# Patient Record
Sex: MALE | Race: WHITE | NOT HISPANIC OR LATINO | Employment: OTHER | URBAN - METROPOLITAN AREA
[De-identification: names, ages, dates, MRNs, and addresses within clinical notes are randomized per-mention and may not be internally consistent; named-entity substitution may affect disease eponyms.]

---

## 2017-05-08 ENCOUNTER — HOSPITAL ENCOUNTER (EMERGENCY)
Facility: CLINIC | Age: 67
Discharge: HOME | End: 2017-05-08
Attending: PHYSICIAN ASSISTANT | Admitting: PHYSICIAN ASSISTANT
Payer: COMMERCIAL

## 2017-05-08 VITALS
SYSTOLIC BLOOD PRESSURE: 159 MMHG | TEMPERATURE: 99.1 F | DIASTOLIC BLOOD PRESSURE: 71 MMHG | HEIGHT: 68 IN | OXYGEN SATURATION: 95 % | RESPIRATION RATE: 16 BRPM
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
## 2017-05-08 PROCEDURE — 99213 OFFICE O/P EST LOW 20 MIN: CPT | Performed by: PHYSICIAN ASSISTANT

## 2017-05-08 PROCEDURE — 99213 OFFICE O/P EST LOW 20 MIN: CPT

## 2017-05-08 RX ORDER — DOXYCYCLINE 100 MG/1
100 CAPSULE ORAL 2 TIMES DAILY
Qty: 20 CAPSULE | Refills: 0 | Status: SHIPPED | OUTPATIENT
Start: 2017-05-08 | End: 2017-05-18

## 2017-05-08 RX ORDER — CEPHALEXIN 500 MG/1
500 CAPSULE ORAL 4 TIMES DAILY
Qty: 40 CAPSULE | Refills: 0 | Status: SHIPPED | OUTPATIENT
Start: 2017-05-08 | End: 2017-05-08

## 2017-05-08 NOTE — ED PROVIDER NOTES
"  History     Chief Complaint   Patient presents with     Wound Infection     HPI  Phil aFm is a 67 year old male who presents with complaints of tick bite to left flank a week ago.  He states the tick was \"large,\" but he is unsure if it was a deer tick or wood tick.  He has since developed surrounding erythema, swelling, and tenderness to the area.  No apparent bulls-eye rash noted.  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  He denies any other associated symptoms.  Denies fevers, chills, fatigue, or weakness.    I have reviewed the Medications, Allergies, Past Medical and Surgical History, and Social History in the Epic system.    Review of Systems   Constitutional: Negative.    Respiratory: Negative.    Cardiovascular: Negative.    Musculoskeletal: Negative.    Skin:        Tick bite to left flank with surrounding erythema, swelling   Neurological: Negative.    All other systems reviewed and are negative.      Physical Exam   BP: 159/71  Heart Rate: 92  Temp: 99.1  F (37.3  C)  Resp: 16  Height: 172.7 cm (5' 8\")  SpO2: 95 %  Physical Exam   Constitutional: He appears well-developed and well-nourished. No distress.   HENT:   Head: Normocephalic and atraumatic.   Cardiovascular: Normal rate, regular rhythm and normal heart sounds.    Pulmonary/Chest: Effort normal and breath sounds normal.   Musculoskeletal: Normal range of motion.   Neurological: He is alert.   Skin: Skin is warm and dry.   Tick bite site to left flank.  There is surrounding erythema, swelling, tenderness, and fluctuance.  There is a central scab.  No evidence of erythema migrans rash.         ED Course     ED Course     Procedures      Assessments & Plan (with Medical Decision Making)     Pt is a 67 year old male who presents with complaints of tick bite to left flank a week ago.  He states the tick was \"large,\" but he is unsure if it was a deer tick or wood tick.  He has since developed surrounding " erythema, swelling, and tenderness to the area.  No apparent bulls-eye rash noted.  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  He denies any other associated symptoms.  Denies fevers, chills, fatigue, or weakness.  Pt is afebrile on arrival.  Exam as above.  Hand-outs provided.    Patient was sent with Doxycycline and was instructed to follow-up with PCP if no improvement in 5-7 days for continued care and management or sooner if new or worsening symptoms.  He is to return to the ED for persistent and/or worsening symptoms.  Patient expressed understanding of the diagnosis and plan and was discharged home in good condition.    I have reviewed the nursing notes.    I have reviewed the findings, diagnosis, plan and need for follow up with the patient.    Discharge Medication List as of 5/8/2017  5:48 PM      START taking these medications    Details   doxycycline (VIBRAMYCIN) 100 MG capsule Take 1 capsule (100 mg) by mouth 2 times daily for 10 days, Disp-20 capsule, R-0, E-Prescribe             Final diagnoses:   Infected tick bite, initial encounter       5/8/2017   [facility] EMERGENCY DEPARTMENT     Geri Flores PA-C  05/10/17 5780

## 2017-05-08 NOTE — DISCHARGE INSTRUCTIONS
Tick Bite (Abx Tx)    Ticks are small insects that feed on the blood of rodents, rabbits, birds, deer, dogs and humans. The bite may cause a local reaction like that of a spider, with a small amount of local redness, itching and slight swelling. Sometimes there is no local reaction.  Most tick bites are harmless, but some ticks carry diseases, such as Lyme disease or [place] spotted fever, that can be passed to people at the time of the bite. Lyme disease is of greatest concern. At the present time, you have no symptoms of Lyme disease or other serious reaction to the bite. It is important to watch for the warning signs, which could appear weeks to months after the tick bite.  Home care  The following guidelines can help you care for your bite at home:  1. If itching is a problem, avoid tight clothing and anything that heats up your skin (hot showers/baths, direct sunlight). This often makes the itching worse. Use ice packs to help with redness and itching.  2. An ice pack (ice cubes in a plastic bag, wrapped in a towel) will reduce local areas of redness and itching. Over-the-counter creams containing benzocaine may help with itching.  3. If large areas of the skin are involved and if no other antihistamine was prescribed, over-the-counter antihistamines with diphenhydramine can be used. These are available at drug and grocery stores. These may be used to reduce itching if large areas of the skin are involved. If these are not helpful, talk to your doctor or pharmacist about other over-the counter medicines that may be helpful.  4. Antibiotics may be prescribed to reduce your risk of getting Lyme disease. It is very important that you take them exactly as directed until they are completely finished.  Follow-up care  Follow up with your doctor or as advised.  When to seek medical care  Get prompt medical attention if any of the following occur:  Signs off local infection (the next few days)  include:    Increasing redness around the bite site    Increased pain or swelling    Fever over 100.4 F (38.0 C)    Fluid draining from the bite area  Signs of tick-related disease (next few weeks to months) include:    Circular red ring-like rash appears at the bite area within 1 to 3 weeks    Tiredness, fever or chills, nausea or vomiting    Neck pain or stiffness, headache, confusion    Muscle, bone aching    Irregular or rapid heart beat    Joint pain or swelling (especially the knee joint)    Numbness or tingling or weakness in the arms or legs    Weakness on one side of the face    3659-5038 [employer]. [address] [place], [place] [address]. All rights reserved. This information is not intended as a substitute for professional medical care. Always follow your healthcare professional's instructions.

## 2017-05-08 NOTE — ED AVS SNAPSHOT
[facility] Emergency Department    [address] [address]    Phone:  [phone]    Fax:  [phone]                                       Phil Fam   MRN: [id number]    Department:  [facility] Emergency Department   Date of Visit:  5/8/2017           After Visit Summary Signature Page     I have received my discharge instructions, and my questions have been answered. I have discussed any challenges I see with this plan with the nurse or doctor.    ..........................................................................................................................................  Patient/Patient Representative Signature      ..........................................................................................................................................  Patient Representative Print Name and Relationship to Patient    ..................................................               ................................................  Date                                            Time    ..........................................................................................................................................  Reviewed by Signature/Title    ...................................................              ..............................................  Date                                                            Time

## 2017-05-08 NOTE — ED AVS SNAPSHOT
[facility] Emergency Department    [address] [address]    Phone:  [phone]    Fax:  [phone]                                       Phil Fam   MRN: [id number]    Department:  [facility] Emergency Department   Date of Visit:  5/8/2017           Patient Information     Date Of Birth          1950        Your diagnoses for this visit were:     Infected tick bite, initial encounter        You were seen by Geri Flores PA-C.      Follow-up Information     Call Dewey Lema MD.    Specialty:  Internal Medicine    Why:  As needed, For persistent symptoms    Contact information:    [facility]  [address] [address]  [phone]          Follow up with [facility] Emergency Department.    Specialty:  EMERGENCY MEDICINE    Why:  As needed, If symptoms worsen    Contact information:    [address] [place] [id number] [phone]    Additional information:    The medical center is located at   [address] [place] [address] (between [facility] and   [address] in [place], four miles north   of [place]).        Discharge Instructions         Tick Bite (Abx Tx)    Ticks are small insects that feed on the blood of rodents, rabbits, birds, deer, dogs and humans. The bite may cause a local reaction like that of a spider, with a small amount of local redness, itching and slight swelling. Sometimes there is no local reaction.  Most tick bites are harmless, but some ticks carry diseases, such as Lyme disease or [place] spotted fever, that can be passed to people at the time of the bite. Lyme disease is of greatest concern. At the present time, you have no symptoms of Lyme disease or other serious reaction to the bite. It is important to watch for the warning signs, which could appear weeks to months after the tick bite.  Home care  The following guidelines can help you care for your bite at home:  1. If itching is a  problem, avoid tight clothing and anything that heats up your skin (hot showers/baths, direct sunlight). This often makes the itching worse. Use ice packs to help with redness and itching.  2. An ice pack (ice cubes in a plastic bag, wrapped in a towel) will reduce local areas of redness and itching. Over-the-counter creams containing benzocaine may help with itching.  3. If large areas of the skin are involved and if no other antihistamine was prescribed, over-the-counter antihistamines with diphenhydramine can be used. These are available at drug and grocery stores. These may be used to reduce itching if large areas of the skin are involved. If these are not helpful, talk to your doctor or pharmacist about other over-the counter medicines that may be helpful.  4. Antibiotics may be prescribed to reduce your risk of getting Lyme disease. It is very important that you take them exactly as directed until they are completely finished.  Follow-up care  Follow up with your doctor or as advised.  When to seek medical care  Get prompt medical attention if any of the following occur:  Signs off local infection (the next few days) include:    Increasing redness around the bite site    Increased pain or swelling    Fever over 100.4 F (38.0 C)    Fluid draining from the bite area  Signs of tick-related disease (next few weeks to months) include:    Circular red ring-like rash appears at the bite area within 1 to 3 weeks    Tiredness, fever or chills, nausea or vomiting    Neck pain or stiffness, headache, confusion    Muscle, bone aching    Irregular or rapid heart beat    Joint pain or swelling (especially the knee joint)    Numbness or tingling or weakness in the arms or legs    Weakness on one side of the face    7355-0788 [employer]. [address] [place] [address], [place] [address]. All rights reserved. This information is not intended as a substitute for professional medical care. Always follow your healthcare  professional's instructions.          24 Hour Appointment Hotline       To make an appointment at any [facility], call [phone] ([phone]). If you don't have a family doctor or clinic, we will help you find one. [facility] clinics are conveniently located to serve the needs of you and your family.             Review of your medicines      START taking        Dose / Directions Last dose taken    doxycycline 100 MG capsule   Commonly known as:  VIBRAMYCIN   Dose:  100 mg   Quantity:  20 capsule        Take 1 capsule (100 mg) by mouth 2 times daily for 10 days   Refills:  0          Our records show that you are taking the medicines listed below. If these are incorrect, please call your family doctor or clinic.        Dose / Directions Last dose taken    ASPIRIN PO   Dose:  325 mg        Take 325 mg by mouth daily   Refills:  0        BOSUTINIB PO   Dose:  400 mg   Indication:  Bolivar Chromosome + Chronic Myelocytic Leukemia        Take 400 mg by mouth daily   Refills:  0        COMPAZINE PO   Dose:  10 mg        Take 10 mg by mouth every 6 hours as needed for nausea   Refills:  0        FLONASE 50 MCG/DOSE nasal spray   Quantity:  one         INHALE 2 SPRAYS IN EACH NOSTRIL ONCE DAILY   Refills:  0        LOPID PO   Dose:  600 mg        Take 600 mg by mouth 2 times daily (before meals)   Refills:  0        METFORMIN HCL PO   Dose:  500 mg        Take 500 mg by mouth 2 times daily (with meals)   Refills:  0        OMEGA-3 FISH OIL PO   Dose:  2 g        Take 2 g by mouth daily   Refills:  0        psyllium 0.52 G capsule   Dose:  1 capsule        Take 1 capsule by mouth daily   Refills:  0        selenium sulfide 2.5 % lotion   Commonly known as:  SELSUN        Apply topically daily as needed for itching or irritation   Refills:  0                Prescriptions were sent or printed at these locations (1 Prescription)                   [facility] - [place], [place] - [address] [facility]   [address]  "[facility]. [facility] [address]    Telephone:  [phone]   Fax:  [phone]   Hours:                  E-Prescribed (1 of 1)         doxycycline (VIBRAMYCIN) 100 MG capsule                Orders Needing Specimen Collection     None      Pending Results     No orders found from 2017 to 2017.            Pending Culture Results     No orders found from 2017 to 2017.            Pending Results Instructions     If you had any lab results that were not finalized at the time of your Discharge, you can call the ED Lab Result RN at [phone]. You will be contacted by this team for any positive Lab results or changes in treatment. The nurses are available 7 days a week from 10A to 6:30P.  You can leave a message 24 hours per day and they will return your call.        Test Results From Your Hospital Stay               Thank you for choosing [facility]       Thank you for choosing [facility] for your care. Our goal is always to provide you with excellent care. Hearing back from our patients is one way we can continue to improve our services. Please take a few minutes to complete the written survey that you may receive in the mail after you visit with us. Thank you!        "[employer] Information     [employer] lets you send messages to your doctor, view your test results, renew your prescriptions, schedule appointments and more. To sign up, go to [email or web address] . Click on \"Log in\" on the left side of the screen, which will take you to the Welcome page. Then click on \"Sign up Now\" on the right side of the page.     You will be asked to enter the access code listed below, as well as some personal information. Please follow the directions to create your username and password.     Your access code is: [email or web address]  Expires: 2017  5:48 PM     Your access code will  in 90 days. If you need help or a new code, please call your [facility] clinic or [phone].        Care EveryWhere ID     This is " your Care EveryWhere ID. This could be used by other organizations to access your [facility] medical records  [id number]        After Visit Summary       This is your record. Keep this with you and show to your community pharmacist(s) and doctor(s) at your next visit.

## 2017-05-10 ASSESSMENT — ENCOUNTER SYMPTOMS
MUSCULOSKELETAL NEGATIVE: 1
CONSTITUTIONAL NEGATIVE: 1
NEUROLOGICAL NEGATIVE: 1
CARDIOVASCULAR NEGATIVE: 1
RESPIRATORY NEGATIVE: 1

## 2017-07-12 ENCOUNTER — HOSPITAL ENCOUNTER (OUTPATIENT)
Dept: CARDIAC REHAB | Facility: CLINIC | Age: 67
End: 2017-07-12
Attending: INTERNAL MEDICINE
Payer: COMMERCIAL

## 2017-07-12 PROCEDURE — 40000116 ZZH STATISTIC OP CR VISIT

## 2017-07-12 PROCEDURE — 93797 PHYS/QHP OP CAR RHAB WO ECG: CPT | Mod: 59

## 2017-07-12 PROCEDURE — 93798 PHYS/QHP OP CAR RHAB W/ECG: CPT

## 2017-07-12 PROCEDURE — 40000575 ZZH STATISTIC OP CARDIAC VISIT #2

## 2017-07-17 ENCOUNTER — HOSPITAL ENCOUNTER (OUTPATIENT)
Dept: CARDIAC REHAB | Facility: CLINIC | Age: 67
End: 2017-07-17
Attending: INTERNAL MEDICINE
Payer: COMMERCIAL

## 2017-07-17 PROCEDURE — 93798 PHYS/QHP OP CAR RHAB W/ECG: CPT | Performed by: REHABILITATION PRACTITIONER

## 2017-07-17 PROCEDURE — 40000116 ZZH STATISTIC OP CR VISIT: Performed by: REHABILITATION PRACTITIONER

## 2017-07-19 ENCOUNTER — HOSPITAL ENCOUNTER (OUTPATIENT)
Dept: CARDIAC REHAB | Facility: CLINIC | Age: 67
End: 2017-07-19
Attending: INTERNAL MEDICINE
Payer: COMMERCIAL

## 2017-07-19 PROCEDURE — 40000116 ZZH STATISTIC OP CR VISIT

## 2017-07-19 PROCEDURE — 93798 PHYS/QHP OP CAR RHAB W/ECG: CPT

## 2017-07-21 ENCOUNTER — HOSPITAL ENCOUNTER (OUTPATIENT)
Dept: CARDIAC REHAB | Facility: CLINIC | Age: 67
End: 2017-07-21
Attending: INTERNAL MEDICINE
Payer: COMMERCIAL

## 2017-07-21 PROCEDURE — 93798 PHYS/QHP OP CAR RHAB W/ECG: CPT

## 2017-07-21 PROCEDURE — 40000116 ZZH STATISTIC OP CR VISIT

## 2017-07-24 ENCOUNTER — HOSPITAL ENCOUNTER (OUTPATIENT)
Dept: CARDIAC REHAB | Facility: CLINIC | Age: 67
End: 2017-07-24
Attending: INTERNAL MEDICINE
Payer: COMMERCIAL

## 2017-07-24 PROCEDURE — 40000116 ZZH STATISTIC OP CR VISIT

## 2017-07-24 PROCEDURE — 93798 PHYS/QHP OP CAR RHAB W/ECG: CPT

## 2017-07-26 ENCOUNTER — HOSPITAL ENCOUNTER (OUTPATIENT)
Dept: CARDIAC REHAB | Facility: CLINIC | Age: 67
End: 2017-07-26
Attending: INTERNAL MEDICINE
Payer: COMMERCIAL

## 2017-07-26 PROCEDURE — 40000116 ZZH STATISTIC OP CR VISIT

## 2017-07-26 PROCEDURE — 93798 PHYS/QHP OP CAR RHAB W/ECG: CPT

## 2017-07-28 ENCOUNTER — HOSPITAL ENCOUNTER (OUTPATIENT)
Dept: CARDIAC REHAB | Facility: CLINIC | Age: 67
End: 2017-07-28
Attending: INTERNAL MEDICINE
Payer: COMMERCIAL

## 2017-07-28 PROCEDURE — 93798 PHYS/QHP OP CAR RHAB W/ECG: CPT | Performed by: REHABILITATION PRACTITIONER

## 2017-07-28 PROCEDURE — 40000116 ZZH STATISTIC OP CR VISIT: Performed by: REHABILITATION PRACTITIONER

## 2017-07-30 ENCOUNTER — HOSPITAL ENCOUNTER (EMERGENCY)
Facility: CLINIC | Age: 67
Discharge: HOME | End: 2017-07-30
Attending: EMERGENCY MEDICINE | Admitting: EMERGENCY MEDICINE
Payer: COMMERCIAL

## 2017-07-30 VITALS
DIASTOLIC BLOOD PRESSURE: 75 MMHG | RESPIRATION RATE: 22 BRPM | TEMPERATURE: 99 F | SYSTOLIC BLOOD PRESSURE: 129 MMHG | OXYGEN SATURATION: 98 % | HEART RATE: 92 BPM

## 2017-07-30 DIAGNOSIS — N18.9 CHRONIC KIDNEY DISEASE, UNSPECIFIED: ICD-10-CM

## 2017-07-30 DIAGNOSIS — C92.10 CHRONIC MYELOID LEUKEMIA (H): ICD-10-CM

## 2017-07-30 DIAGNOSIS — M10.9 ACUTE GOUTY ARTHRITIS: ICD-10-CM

## 2017-07-30 DIAGNOSIS — Z95.1 S/P CABG (CORONARY ARTERY BYPASS GRAFT): ICD-10-CM

## 2017-07-30 PROCEDURE — 99283 EMERGENCY DEPT VISIT LOW MDM: CPT | Performed by: EMERGENCY MEDICINE

## 2017-07-30 PROCEDURE — 25000132 ZZH RX MED GY IP 250 OP 250 PS 637: Performed by: EMERGENCY MEDICINE

## 2017-07-30 PROCEDURE — 99284 EMERGENCY DEPT VISIT MOD MDM: CPT | Mod: Z6 | Performed by: EMERGENCY MEDICINE

## 2017-07-30 RX ORDER — CLOPIDOGREL BISULFATE 75 MG/1
75 TABLET ORAL DAILY
COMMUNITY
Start: 2017-07-06 | End: 2018-07-06

## 2017-07-30 RX ORDER — LIDOCAINE 50 MG/G
OINTMENT TOPICAL PRN
COMMUNITY
Start: 2017-07-06

## 2017-07-30 RX ORDER — ACETAMINOPHEN 500 MG
1000 TABLET ORAL 3 TIMES DAILY
COMMUNITY
Start: 2017-07-06

## 2017-07-30 RX ORDER — AMOXICILLIN 250 MG
1 CAPSULE ORAL 2 TIMES DAILY
COMMUNITY
Start: 2017-07-06

## 2017-07-30 RX ORDER — HYDROCODONE BITARTRATE AND ACETAMINOPHEN 5; 325 MG/1; MG/1
TABLET ORAL
Qty: 20 TABLET | Refills: 0 | Status: SHIPPED | OUTPATIENT
Start: 2017-07-30

## 2017-07-30 RX ORDER — NITROGLYCERIN 0.4 MG/1
0.4 TABLET SUBLINGUAL EVERY 5 MIN PRN
COMMUNITY
Start: 2017-05-30

## 2017-07-30 RX ORDER — HYDROCODONE BITARTRATE AND ACETAMINOPHEN 5; 325 MG/1; MG/1
2 TABLET ORAL ONCE
Status: COMPLETED | OUTPATIENT
Start: 2017-07-30 | End: 2017-07-30

## 2017-07-30 RX ORDER — METHYLPREDNISOLONE 4 MG
TABLET, DOSE PACK ORAL
Qty: 21 TABLET | Refills: 0 | Status: SHIPPED | OUTPATIENT
Start: 2017-07-30

## 2017-07-30 RX ORDER — ASPIRIN 81 MG/1
81 TABLET, CHEWABLE ORAL DAILY
COMMUNITY
Start: 2017-07-06

## 2017-07-30 RX ORDER — METOPROLOL TARTRATE 37.5 MG/1
37.5 TABLET, FILM COATED ORAL 2 TIMES DAILY
COMMUNITY
Start: 2017-07-06 | End: 2017-08-05

## 2017-07-30 RX ORDER — CILOSTAZOL 100 MG/1
100 TABLET ORAL 2 TIMES DAILY
COMMUNITY
Start: 2017-05-30 | End: 2018-05-30

## 2017-07-30 RX ORDER — FERROUS SULFATE 325(65) MG
325 TABLET ORAL
COMMUNITY
Start: 2017-07-06

## 2017-07-30 RX ADMIN — HYDROCODONE BITARTRATE AND ACETAMINOPHEN 2 TABLET: 5; 325 TABLET ORAL at 12:41

## 2017-07-30 ASSESSMENT — ENCOUNTER SYMPTOMS
WEAKNESS: 0
CONSTITUTIONAL NEGATIVE: 1
WOUND: 0
NUMBNESS: 0

## 2017-07-30 NOTE — ED NOTES
Quadruple bypass 4 weeks ago, this is the second bought of gout. Kidney issues, has already had a medrol dosepac.

## 2017-07-30 NOTE — ED AVS SNAPSHOT
[facility]    [address] [address]    Phone:  [phone]    Fax:  [phone]                                       Phil Fam   MRN: [id number]    Department:  [facility]   Date of Visit:  7/30/2017           After Visit Summary Signature Page     I have received my discharge instructions, and my questions have been answered. I have discussed any challenges I see with this plan with the nurse or doctor.    ..........................................................................................................................................  Patient/Patient Representative Signature      ..........................................................................................................................................  Patient Representative Print Name and Relationship to Patient    ..................................................               ................................................  Date                                            Time    ..........................................................................................................................................  Reviewed by Signature/Title    ...................................................              ..............................................  Date                                                            Time

## 2017-07-30 NOTE — DISCHARGE INSTRUCTIONS
"  What Is Gout?  Gout is a disease that affects the joints. Left untreated, it can lead to painful foot and joint deformities and even kidney problems. But, by treating gout early, you can relieve pain and help prevent future problems. Gout can usually be treated with medication and proper diet. In severe cases, surgery may be needed.  What causes gout?  Gout is caused by an excess of uric acid (a waste product made by the body). Uric acid is excreted by the kidneys. If the uric acid level in your blood rises too high, the uric acid may form crystals that collect in the joints, bringing on a gout attack. If you have many gout attacks, crystals may form large deposits called tophi. Tophi can damage joints and cause deformity.  Who is at risk for gout?  Men are more likely to have gout than women. But women can also be affected, mostly after menopause. Some health problems, such as obesity and high cholesterol, make gout more likely. And some medications, such as diuretics ( water pills ), can trigger a gout attack. People who drink a lot of alcohol are at high risk for gout. Certain foods can also trigger a gout attack.  Substances that may trigger a gout attack  To help prevent a gout attack, avoid these foods:    Alcohol (particularly beer, but also red wine and spirits)    Certain meats (red meat, processed meat, turkey)    Organ meats (kidney, liver, sweetbread)    Shellfish (lobster, crab, shrimp, scallop, mussel)    Certain fish (anchovy, sardine, herring, mackerel)   Treatment    Lifestyle changes, including weight loss, exercise, and quitting tobacco use    Reducing consumption of the food groups above as well as high fructose corn syrup, found in many foods including sodas and energy drinks    Changing non-essential medications that may contribute to gout (such as thiazide diuretics--\"water pills\")    Medications to reduce the amount of uric acid in the blood, such as allopurinol or others    Medications to " treat acute gout attacks, including NSAIDs (nonsteroidal anti-inflammatory medicines), steroids, and colchicine  Date Last Reviewed: 2/1/2016 2000-2017 [employer]. [address] [place] [address], [place] [address]. All rights reserved. This information is not intended as a substitute for professional medical care. Always follow your healthcare professional's instructions.          Eating to Prevent Gout  Gout is a painful form of arthritis caused by an excess of uric acid. This is a waste product made by the body. It builds up in the body and forms crystals that collect in the joints, bringing on a gout attack. Alcohol and certain foods can trigger a gout attack. Below are some guidelines for changing your diet to help you manage gout. Your healthcare provider can work with you to determine the best eating plan for you. Know that diet is only one part of managing gout. Take your medicines as prescribed and follow the other guidelines your healthcare provider has given you.  Foods to limit  Eating too many foods containing purines may increase the levels of uric acid in your body and increase your risk for a gout attack. It may be best to limit these high-purine foods:    Alcohol (beer, red wine). You may be told to avoid alcohol completely.    Certain fish (anchovies, sardines, fish roes, herring, tuna, mussels, codfish, scallops, trout, and sonu)    Certain meats (red meat, processed meat, zavala, turkey, wild game, and goose)    Sauces and gravies made with meat    Organ meats (such as liver, kidneys, sweetbreads, and tripe)    Legumes (such as dried beans, peas)    Mushrooms, spinach, asparagus, and cauliflower    Yeast and yeast extract supplements  Foods to try  Some foods may be helpful for people with gout. You may want to try adding some of the following foods to your diet:    Dark berries: These include blueberries, blackberries, and cherries. These berries contain chemicals that may lower uric  acid.    Tofu: Tofu, which is made from soy, is a good source of protein. Studies have shown that it may be a better choice than meat for people with gout.    Omega fatty acids: These acids are found in fatty fish (such as salmon), certain oils (such as flax, olive, or nut oils), or nuts. They may help prevent inflammation due to gout.  The following guidelines are recommended by the American Medical Association for people with gout. Your diet should be:    High in fiber, whole grains, fruits, and vegetables.    Low in protein (15% of calories should come from protein. Choose lean sources such as soy, lean meats, and poultry).    Low in fat (no more than 30% of calories should come from fat, with only 10% coming from animal fat).   Date Last Reviewed: 6/17/2015 2000-2017 [employer]. [address] [place] [address]. All rights reserved. This information is not intended as a substitute for professional medical care. Always follow your healthcare professional's instructions.          Gout Diet  Gout is a painful condition caused by an excess of uric acid, a waste product made by the body. Uric acid forms crystals that collect in the joints. The immune response to these crystals brings on symptoms of joint pain and swelling. This is called a gout attack. Often, medications and diet changes are combined to manage gout. Below are some guidelines for changing your diet to help you manage gout and prevent attacks. Your health care provider will help you determine the best eating plan for you.     Eating to manage gout  Weight loss for those who are overweight may help reduce gout attacks.  Eat less of these foods  Eating too many foods containing purines may raise the levels of uric acid in your body. This raises your risk for a gout attack. Try to limit these foods and drinks:    Alcohol, such as beer and red wine. You may be told to avoid alcohol completely.    Soft drinks that contain sugar or  high fructose corn syrup    Certain fish, including anchovies, sardines, fish eggs, and herring    Shellfish    Certain meats, such as red meat, hot dogs, luncheon meats, and turkey    Organ meats, such as liver, kidneys, and sweetbreads    Legumes, such as dried beans and peas    Other high fat foods such as gravy, whole milk, and high fat cheeses    Vegetables such as asparagus, cauliflower, spinach, and mushrooms used to be thought to contribute to an increased risk for a gout attack, but recent studies show that high purine vegetables don't increase the risk for a gout attack.  Eat more of these foods  Other foods may be helpful for people with gout. Add some of these foods to your diet:    Cherries contain chemicals that may lower uric acid.    Omega fatty acids. These are found in some fatty fish such as salmon, certain oils (flax, olive, or nut), and nuts themselves. Omega fatty acids may help prevent inflammation due to gout.    Dairy products that are low-fat or fat-free, such as cheese and yogurt    Complex carbohydrate foods, including whole grains, brown rice, oats, and beans    Coffee, in moderation    Water, approximately 64 ounces per day  Follow-up care  Follow up with your healthcare provider as advised.  When to seek medical advice  Call your healthcare provider right away if any of these occur:    Return of gout symptoms, usually at night:    Severe pain, swelling, and heat in a joint, especially the base of the big toe    Affected joint is hard to move    Skin of the affected joint is purple or red    Fever of 100.4 F (38 C) or higher    Pain that doesn't get better even with prescribed medicine   Date Last Reviewed: 1/12/2016 2000-2017 [employer]. [address] [place] [address]. All rights reserved. This information is not intended as a substitute for professional medical care. Always follow your healthcare professional's instructions.          Treating Gout  Attacks     Raising the joint above the level of your heart can help reduce gout symptoms.     Gout is a disease that affects the joints. It is caused by excess uric acid in your blood stream that may lead to crystals forming in your joints. Left untreated, it can lead to painful foot and joint deformities and even kidney problems. But, by treating gout early, you can relieve pain and help prevent future problems. Gout can usually be treated with medication and proper diet. In severe cases, surgery may be needed.  Gout attacks are painful and often happen more than once. Taking medications may reduce pain and prevent attacks in the future. There are also some things you can do at home to relieve symptoms.  Medications for gout  Your healthcare provider may prescribe a daily medication to reduce levels of uric acid. Reducing your uric acid levels may help prevent gout attacks. Allopurinol is one commonly used medication taken daily to reduce uric acid levels. Other medications can help relieve pain and swelling during an acute attack. Medicines such as NSAIDs (nonsteroidal anti-inflammatory medicines), steroids, and colchicine may be prescribed for intermittent use to relieve an acute gout attack. Be sure to take your medication as directed.  What you can do  Below are some things you can do at home to relieve gout symptoms. Your healthcare provider may have other tips.    Rest the painful joint as much as you can.    Raise the painful joint so it is at a level higher than your heart.    Use ice for 10 minutes every 1-2 hours as possible.  How can I prevent gout?  With a little effort, you may be able to prevent gout attacks in the future. Here are some things you can do:    Avoid foods high in purines    Certain meats (red meat, processed meat, turkey)    Organ meats (kidney, liver, sweetbread)    Shellfish (lobster, crab, shrimp, scallop, mussel)    Certain fish (anchovy, sardine, herring, mackerel)    Take any  medications prescribed by your healthcare provider.    Lose weight if you need to.    Reduce high fructose corn syrup in meals and drinks.    Reduce or eliminate consumption of alcohol, particularly beer, but also red wine and spirits.    Control blood pressure, diabetes, and cholesterol.    Drink plenty of water to help flush uric acid from your body.  Date Last Reviewed: 2/1/2016 2000-2017 [employer]. [address] [place] [address], [place] [address]. All rights reserved. This information is not intended as a substitute for professional medical care. Always follow your healthcare professional's instructions.

## 2017-07-30 NOTE — ED PROVIDER NOTES
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 Similar flare earlier this month was treated effectively with a Medrol Dosepak which he completed approximately 1.5 weeks ago.  He has a history of CML and is on chemotherapy and has a history of chronic kidney disease.  Right great toe pain was insidious in onset, and is aching and throbbing, constant, gradually progressive and worsening, now severe.  Pain is nonradiating.  Unable to take NSAIDs due to history of chronic kidney disease.    I have reviewed the Medications, Allergies, Past Medical and Surgical History, and Social History in the Epic system.  Patient Active Problem List   Diagnosis     Tobacco use disorder     Allergic rhinitis     Senile nuclear sclerosis     Past Medical History:   Diagnosis Date     CML (chronic myelocytic leukaemia)      Diabetes (H)      Duodenal ulcer, unspecified as acute or chronic, without hemorrhage, perforation, or obstruction      Other malaria      Past Surgical History:   Procedure Laterality Date     PHACOEMULSIFICATION WITH STANDARD INTRAOCULAR LENS IMPLANT Right 2/12/2015    Procedure: PHACOEMULSIFICATION WITH STANDARD INTRAOCULAR LENS IMPLANT;  Surgeon: Chucky Barrera MD;  Location: [facility] OR     PHACOEMULSIFICATION WITH STANDARD INTRAOCULAR LENS IMPLANT Left 3/18/2015    Procedure: PHACOEMULSIFICATION WITH STANDARD INTRAOCULAR LENS IMPLANT;  Surgeon: Chucky Barrera MD;  Location: [facility] OR     SURGICAL HISTORY OF -       septoplasty     SURGICAL HISTORY OF -   07/18/2000    colonoscopy     No current facility-administered medications for this encounter.      Current  Outpatient Prescriptions   Medication     methylPREDNISolone (MEDROL DOSEPAK) 4 MG tablet     HYDROcodone-acetaminophen (NORCO) 5-325 MG per tablet     ASPIRIN PO     BOSUTINIB PO     Gemfibrozil (LOPID PO)     METFORMIN HCL PO     Omega-3 Fatty Acids (OMEGA-3 FISH OIL PO)     Prochlorperazine Maleate (COMPAZINE PO)     psyllium 0.52 G capsule     selenium sulfide (SELSUN) 2.5 % shampoo     FLONASE INHA 50 MCG/DOSE NA     Allergies   Allergen Reactions     Nka [No Known Allergies]      Social History   Substance Use Topics     Smoking status: Current Every Day Smoker     Packs/day: 1.00     Years: 35.00     Types: Cigarettes     Smokeless tobacco: Not on file     Alcohol use Yes      Comment: socially     Family History   Problem Relation Age of Onset     DIABETES Mother      CANCER Father      Review of Systems   Constitutional: Negative.    Skin: Negative for pallor, rash and wound.   Neurological: Negative for weakness and numbness.       Physical Exam   BP: 129/75  Pulse: 92  Temp: 99  F (37.2  C)  Resp: 22  SpO2: 98 %  Physical Exam   Constitutional: He is oriented to person, place, and time. He appears well-developed and well-nourished. No distress.   HENT:   Head: Normocephalic and atraumatic.   Eyes: Conjunctivae and EOM are normal. No scleral icterus.   Neck: Normal range of motion. Neck supple.   Cardiovascular: Normal rate, regular rhythm and intact distal pulses.    Pulmonary/Chest: Effort normal. No respiratory distress.   Abdominal: Soft. Bowel sounds are normal. He exhibits no distension and no mass. There is no tenderness. There is no rebound and no guarding.   Musculoskeletal: Normal range of motion. He exhibits edema (right great toe MTP joint area) and tenderness (right great toe MTP).   Neurological: He is alert and oriented to person, place, and time. He has normal strength. No sensory deficit.   Skin: Skin is warm and dry. No rash noted. He is not diaphoretic. There is erythema (mild erythema  and warmth about the right great toe MTP joint, no crepitance or lymphangitis). No pallor.   Psychiatric: He has a normal mood and affect. His behavior is normal.   Nursing note and vitals reviewed.      ED Course     ED Course     Procedures               Labs Ordered and Resulted from Time of ED Arrival Up to the Time of Departure from the ED - No data to display    Assessments & Plan (with Medical Decision Making)   Patient with history of recent CABG, runny kidney disease, CML on chemotherapy, was secondary gout flare of the right right toe MTP joint in the past month.  Good relief with a Medrol Dosepak earlier this month.  Will repeat this.  Defer colchicine due to his chronic kidney disease.  Rx norco to use as needed for pain. He is unable to take NSAIDs due to history of chronic kidney disease.  We'll have him follow up with his primary care provider in the near future, as currently scheduled, and his heme-onc specialist.    Patient was provided instructions for supportive care and will return as needed for worsened condition or worsening symptoms, or new problems or concerns.    I have reviewed the nursing notes.    I have reviewed the findings, diagnosis, plan and need for follow up with the patient.      Discharge Medication List as of 7/30/2017 12:44 PM      START taking these medications    Details   methylPREDNISolone (MEDROL DOSEPAK) 4 MG tablet Follow package instructions, Disp-21 tablet, R-0, Local Print      HYDROcodone-acetaminophen (NORCO) 5-325 MG per tablet 1-2 tabs po q 4-6 hrs. prn pain, Disp-20 tablet, R-0, Local Print             Final diagnoses:   Acute gouty arthritis - Right great toe MTP joint       7/30/2017   [facility] EMERGENCY DEPARTMENT     Tigre Menendez MD  07/30/17 [id number]

## 2017-08-02 ENCOUNTER — HOSPITAL ENCOUNTER (OUTPATIENT)
Dept: CARDIAC REHAB | Facility: CLINIC | Age: 67
End: 2017-08-02
Attending: INTERNAL MEDICINE
Payer: COMMERCIAL

## 2017-08-02 PROCEDURE — 40000575 ZZH STATISTIC OP CARDIAC VISIT #2

## 2017-08-02 PROCEDURE — 40000116 ZZH STATISTIC OP CR VISIT

## 2017-08-02 PROCEDURE — 93798 PHYS/QHP OP CAR RHAB W/ECG: CPT

## 2017-08-02 PROCEDURE — 93797 PHYS/QHP OP CAR RHAB WO ECG: CPT

## 2017-08-04 ENCOUNTER — HOSPITAL ENCOUNTER (OUTPATIENT)
Dept: CARDIAC REHAB | Facility: CLINIC | Age: 67
End: 2017-08-04
Attending: INTERNAL MEDICINE
Payer: COMMERCIAL

## 2017-08-04 PROCEDURE — 93798 PHYS/QHP OP CAR RHAB W/ECG: CPT

## 2017-08-04 PROCEDURE — 40000116 ZZH STATISTIC OP CR VISIT

## 2017-08-07 ENCOUNTER — HOSPITAL ENCOUNTER (OUTPATIENT)
Dept: CARDIAC REHAB | Facility: CLINIC | Age: 67
End: 2017-08-07
Attending: INTERNAL MEDICINE
Payer: COMMERCIAL

## 2017-08-07 PROCEDURE — 40000116 ZZH STATISTIC OP CR VISIT

## 2017-08-07 PROCEDURE — 93798 PHYS/QHP OP CAR RHAB W/ECG: CPT

## 2017-08-11 ENCOUNTER — HOSPITAL ENCOUNTER (OUTPATIENT)
Dept: CARDIAC REHAB | Facility: CLINIC | Age: 67
End: 2017-08-11
Attending: INTERNAL MEDICINE
Payer: COMMERCIAL

## 2017-08-11 PROCEDURE — 40000116 ZZH STATISTIC OP CR VISIT

## 2017-08-11 PROCEDURE — 93798 PHYS/QHP OP CAR RHAB W/ECG: CPT

## 2017-08-14 ENCOUNTER — HOSPITAL ENCOUNTER (OUTPATIENT)
Dept: CARDIAC REHAB | Facility: CLINIC | Age: 67
End: 2017-08-14
Attending: INTERNAL MEDICINE
Payer: COMMERCIAL

## 2017-08-14 PROCEDURE — 93798 PHYS/QHP OP CAR RHAB W/ECG: CPT

## 2017-08-14 PROCEDURE — 40000116 ZZH STATISTIC OP CR VISIT

## 2017-08-16 ENCOUNTER — HOSPITAL ENCOUNTER (OUTPATIENT)
Dept: CARDIAC REHAB | Facility: CLINIC | Age: 67
End: 2017-08-16
Attending: INTERNAL MEDICINE
Payer: COMMERCIAL

## 2017-08-16 PROCEDURE — 93798 PHYS/QHP OP CAR RHAB W/ECG: CPT

## 2017-08-16 PROCEDURE — 40000575 ZZH STATISTIC OP CARDIAC VISIT #2

## 2017-08-16 PROCEDURE — 40000116 ZZH STATISTIC OP CR VISIT

## 2017-08-16 PROCEDURE — 93797 PHYS/QHP OP CAR RHAB WO ECG: CPT | Mod: XU

## 2017-08-18 ENCOUNTER — HOSPITAL ENCOUNTER (OUTPATIENT)
Dept: CARDIAC REHAB | Facility: CLINIC | Age: 67
End: 2017-08-18
Attending: INTERNAL MEDICINE
Payer: COMMERCIAL

## 2017-08-18 PROCEDURE — 40000116 ZZH STATISTIC OP CR VISIT

## 2017-08-18 PROCEDURE — 93798 PHYS/QHP OP CAR RHAB W/ECG: CPT

## 2017-08-21 ENCOUNTER — HOSPITAL ENCOUNTER (OUTPATIENT)
Dept: CARDIAC REHAB | Facility: CLINIC | Age: 67
End: 2017-08-21
Attending: INTERNAL MEDICINE
Payer: COMMERCIAL

## 2017-08-21 PROCEDURE — 40000116 ZZH STATISTIC OP CR VISIT

## 2017-08-21 PROCEDURE — 93798 PHYS/QHP OP CAR RHAB W/ECG: CPT

## 2017-08-23 ENCOUNTER — HOSPITAL ENCOUNTER (OUTPATIENT)
Dept: CARDIAC REHAB | Facility: CLINIC | Age: 67
End: 2017-08-23
Attending: INTERNAL MEDICINE
Payer: COMMERCIAL

## 2017-08-23 PROCEDURE — 93798 PHYS/QHP OP CAR RHAB W/ECG: CPT

## 2017-08-23 PROCEDURE — 40000116 ZZH STATISTIC OP CR VISIT

## 2017-10-11 ENCOUNTER — HOSPITAL ENCOUNTER (OUTPATIENT)
Dept: CARDIAC REHAB | Facility: CLINIC | Age: 67
End: 2017-10-11
Attending: INTERNAL MEDICINE
Payer: COMMERCIAL

## 2017-10-11 PROCEDURE — 40000116 ZZH STATISTIC OP CR VISIT

## 2017-10-11 PROCEDURE — 93798 PHYS/QHP OP CAR RHAB W/ECG: CPT

## 2017-10-13 ENCOUNTER — HOSPITAL ENCOUNTER (OUTPATIENT)
Dept: CARDIAC REHAB | Facility: CLINIC | Age: 67
End: 2017-10-13
Attending: INTERNAL MEDICINE
Payer: COMMERCIAL

## 2017-10-13 PROCEDURE — 93798 PHYS/QHP OP CAR RHAB W/ECG: CPT

## 2017-10-13 PROCEDURE — 40000116 ZZH STATISTIC OP CR VISIT

## 2017-10-16 ENCOUNTER — HOSPITAL ENCOUNTER (OUTPATIENT)
Dept: CARDIAC REHAB | Facility: CLINIC | Age: 67
End: 2017-10-16
Attending: INTERNAL MEDICINE
Payer: COMMERCIAL

## 2017-10-16 PROCEDURE — 40000116 ZZH STATISTIC OP CR VISIT

## 2017-10-16 PROCEDURE — 93798 PHYS/QHP OP CAR RHAB W/ECG: CPT

## 2017-10-18 ENCOUNTER — HOSPITAL ENCOUNTER (OUTPATIENT)
Dept: CARDIAC REHAB | Facility: CLINIC | Age: 67
End: 2017-10-18
Attending: INTERNAL MEDICINE
Payer: COMMERCIAL

## 2017-10-18 PROCEDURE — 40000575 ZZH STATISTIC OP CARDIAC VISIT #2

## 2017-10-18 PROCEDURE — 93797 PHYS/QHP OP CAR RHAB WO ECG: CPT | Mod: 59

## 2017-10-18 PROCEDURE — 93798 PHYS/QHP OP CAR RHAB W/ECG: CPT

## 2017-10-18 PROCEDURE — 40000116 ZZH STATISTIC OP CR VISIT

## 2017-10-25 ENCOUNTER — HOSPITAL ENCOUNTER (OUTPATIENT)
Dept: CARDIAC REHAB | Facility: CLINIC | Age: 67
End: 2017-10-25
Attending: INTERNAL MEDICINE
Payer: COMMERCIAL

## 2017-10-25 PROCEDURE — 40000116 ZZH STATISTIC OP CR VISIT

## 2017-10-25 PROCEDURE — 93798 PHYS/QHP OP CAR RHAB W/ECG: CPT

## 2017-10-27 ENCOUNTER — HOSPITAL ENCOUNTER (OUTPATIENT)
Dept: CARDIAC REHAB | Facility: CLINIC | Age: 67
End: 2017-10-27
Attending: INTERNAL MEDICINE
Payer: COMMERCIAL

## 2017-10-27 PROCEDURE — 40000116 ZZH STATISTIC OP CR VISIT

## 2017-10-27 PROCEDURE — 93798 PHYS/QHP OP CAR RHAB W/ECG: CPT

## 2017-10-30 ENCOUNTER — HOSPITAL ENCOUNTER (OUTPATIENT)
Dept: CARDIAC REHAB | Facility: CLINIC | Age: 67
End: 2017-10-30
Attending: INTERNAL MEDICINE
Payer: COMMERCIAL

## 2017-10-30 PROCEDURE — 40000575 ZZH STATISTIC OP CARDIAC VISIT #2

## 2017-10-30 PROCEDURE — 40000116 ZZH STATISTIC OP CR VISIT

## 2017-10-30 PROCEDURE — 93797 PHYS/QHP OP CAR RHAB WO ECG: CPT

## 2017-10-30 PROCEDURE — 93798 PHYS/QHP OP CAR RHAB W/ECG: CPT

## 2017-10-30 NOTE — PROGRESS NOTES
Cardiac Rehab Discharge Summary    Reason for discharge:    No further functional progress in rehab at this time due to pericarditis symptoms.    Progress towards goals:  Goals partially met.  Barriers to achieving goals:   limited tolerance for therapy. Pt continues to feel shortness of breath related to pericarditis. Pt has been treating his pericarditis for close to 60 days with minimal improvement. Pt reports his strength is improving but limited with activity and exercise due to shortness of breath.     Recommendation(s):    Continue home exercise program. Patient will keep in contact with rehab staff. If pericarditis symptoms improve to where patient can progress workloads, rehab and/or patient will request a new updated physician order and patient can continue rehab with remaining sessions left from this episode of care.

## 2019-01-31 NOTE — ED AVS SNAPSHOT
[facility] Emergency Department    [address] [address]    Phone:  [phone]    Fax:  [phone]                                       Phil Fam   MRN: [id number]    Department:  [facility] Emergency Department   Date of Visit:  7/30/2017           Patient Information     Date Of Birth          1950        Your diagnoses for this visit were:     Acute gouty arthritis Right great toe MTP joint       You were seen by Tigre Menendez MD.      Follow-up Information     Follow up with Dewey Lema MD.    Specialty:  Internal Medicine    Why:  Follow-up and primary care clinic in the near future, as scheduled.    Contact information:    [facility]  [address] [address]  [phone]          Discharge Instructions         What Is Gout?  Gout is a disease that affects the joints. Left untreated, it can lead to painful foot and joint deformities and even kidney problems. But, by treating gout early, you can relieve pain and help prevent future problems. Gout can usually be treated with medication and proper diet. In severe cases, surgery may be needed.  What causes gout?  Gout is caused by an excess of uric acid (a waste product made by the body). Uric acid is excreted by the kidneys. If the uric acid level in your blood rises too high, the uric acid may form crystals that collect in the joints, bringing on a gout attack. If you have many gout attacks, crystals may form large deposits called tophi. Tophi can damage joints and cause deformity.  Who is at risk for gout?  Men are more likely to have gout than women. But women can also be affected, mostly after menopause. Some health problems, such as obesity and high cholesterol, make gout more likely. And some medications, such as diuretics ( water pills ), can trigger a gout attack. People who drink a lot of alcohol are at high risk for gout. Certain foods can also trigger a gout  Continued Stay Review    Date:  1/31/2019     Vital Signs: /97 (BP Location: Left arm)   Pulse 82   Temp 98 2 °F (36 8 °C) (Temporal)   Resp 18   Ht 5' 8" (1 727 m)   Wt (!) 150 kg (330 lb)   SpO2 96%   BMI 50 18 kg/m²      Tele  Attending Note / Plan pending as of know    Assessment/Plan:   Per Endocrine:  1/30:   59y o -year-old male with hypercalcemia from what appears to be primary hyperparathyroidism, vitamin-D deficiency and history of kidney stones  Plan:  He needs further workup to confirm primary hyperparathyroidism including 24 hour urine calcium  After that he needs further imaging on to confirm the findings of sestamibi scan  For now continue vitamin-D supplementations, keep well hydrated  Schedule outpatient follow-up 3-4 weeks after discharge to complete workup and after that consider referral for surgical resection      Medications:   Scheduled Meds:   Current Facility-Administered Medications:  amLODIPine 10 mg Oral Daily    carvedilol 6 25 mg Oral BID With Meals    enoxaparin 40 mg Subcutaneous Daily    hydrALAZINE 25 mg Oral Q8H [facility]    lisinopril 40 mg Oral Daily    pravastatin 10 mg Oral After Dinner      Continuous Infusions:    PRN Meds:   Pertinent Labs/Diagnostic Results:   Age/Sex: 59 y o  male   Discharge Plan:  TBD      Network Utilization Review Department  Phone: [phone]; Fax [phone]  [email or web address]  [email or web address]  ATTENTION: Please call with any questions or concerns to [phone]  and carefully listen to the prompts so that you are directed to the right person  Send all requests for admission clinical reviews, approved or denied determinations and any other requests to fax [phone]   All voicemails are confidential  "attack.  Substances that may trigger a gout attack  To help prevent a gout attack, avoid these foods:    Alcohol (particularly beer, but also red wine and spirits)    Certain meats (red meat, processed meat, turkey)    Organ meats (kidney, liver, sweetbread)    Shellfish (lobster, crab, shrimp, scallop, mussel)    Certain fish (anchovy, sardine, herring, mackerel)   Treatment    Lifestyle changes, including weight loss, exercise, and quitting tobacco use    Reducing consumption of the food groups above as well as high fructose corn syrup, found in many foods including sodas and energy drinks    Changing non-essential medications that may contribute to gout (such as thiazide diuretics--\"water pills\")    Medications to reduce the amount of uric acid in the blood, such as allopurinol or others    Medications to treat acute gout attacks, including NSAIDs (nonsteroidal anti-inflammatory medicines), steroids, and colchicine  Date Last Reviewed: 2/1/2016 2000-2017 [employer]. [address] [place] [address]. All rights reserved. This information is not intended as a substitute for professional medical care. Always follow your healthcare professional's instructions.          Eating to Prevent Gout  Gout is a painful form of arthritis caused by an excess of uric acid. This is a waste product made by the body. It builds up in the body and forms crystals that collect in the joints, bringing on a gout attack. Alcohol and certain foods can trigger a gout attack. Below are some guidelines for changing your diet to help you manage gout. Your healthcare provider can work with you to determine the best eating plan for you. Know that diet is only one part of managing gout. Take your medicines as prescribed and follow the other guidelines your healthcare provider has given you.  Foods to limit  Eating too many foods containing purines may increase the levels of uric acid in your body and increase your risk " for a gout attack. It may be best to limit these high-purine foods:    Alcohol (beer, red wine). You may be told to avoid alcohol completely.    Certain fish (anchovies, sardines, fish roes, herring, tuna, mussels, codfish, scallops, trout, and sonu)    Certain meats (red meat, processed meat, zavala, turkey, wild game, and goose)    Sauces and gravies made with meat    Organ meats (such as liver, kidneys, sweetbreads, and tripe)    Legumes (such as dried beans, peas)    Mushrooms, spinach, asparagus, and cauliflower    Yeast and yeast extract supplements  Foods to try  Some foods may be helpful for people with gout. You may want to try adding some of the following foods to your diet:    Dark berries: These include blueberries, blackberries, and cherries. These berries contain chemicals that may lower uric acid.    Tofu: Tofu, which is made from soy, is a good source of protein. Studies have shown that it may be a better choice than meat for people with gout.    Omega fatty acids: These acids are found in fatty fish (such as salmon), certain oils (such as flax, olive, or nut oils), or nuts. They may help prevent inflammation due to gout.  The following guidelines are recommended by the American Medical Association for people with gout. Your diet should be:    High in fiber, whole grains, fruits, and vegetables.    Low in protein (15% of calories should come from protein. Choose lean sources such as soy, lean meats, and poultry).    Low in fat (no more than 30% of calories should come from fat, with only 10% coming from animal fat).   Date Last Reviewed: 6/17/2015 2000-2017 [employer]. [address] [place] [address], [place] [address]. All rights reserved. This information is not intended as a substitute for professional medical care. Always follow your healthcare professional's instructions.          Gout Diet  Gout is a painful condition caused by an excess of uric acid, a waste product made by the  body. Uric acid forms crystals that collect in the joints. The immune response to these crystals brings on symptoms of joint pain and swelling. This is called a gout attack. Often, medications and diet changes are combined to manage gout. Below are some guidelines for changing your diet to help you manage gout and prevent attacks. Your health care provider will help you determine the best eating plan for you.     Eating to manage gout  Weight loss for those who are overweight may help reduce gout attacks.  Eat less of these foods  Eating too many foods containing purines may raise the levels of uric acid in your body. This raises your risk for a gout attack. Try to limit these foods and drinks:    Alcohol, such as beer and red wine. You may be told to avoid alcohol completely.    Soft drinks that contain sugar or high fructose corn syrup    Certain fish, including anchovies, sardines, fish eggs, and herring    Shellfish    Certain meats, such as red meat, hot dogs, luncheon meats, and turkey    Organ meats, such as liver, kidneys, and sweetbreads    Legumes, such as dried beans and peas    Other high fat foods such as gravy, whole milk, and high fat cheeses    Vegetables such as asparagus, cauliflower, spinach, and mushrooms used to be thought to contribute to an increased risk for a gout attack, but recent studies show that high purine vegetables don't increase the risk for a gout attack.  Eat more of these foods  Other foods may be helpful for people with gout. Add some of these foods to your diet:    Cherries contain chemicals that may lower uric acid.    Omega fatty acids. These are found in some fatty fish such as salmon, certain oils (flax, olive, or nut), and nuts themselves. Omega fatty acids may help prevent inflammation due to gout.    Dairy products that are low-fat or fat-free, such as cheese and yogurt    Complex carbohydrate foods, including whole grains, brown rice, oats, and beans    Coffee, in  moderation    Water, approximately 64 ounces per day  Follow-up care  Follow up with your healthcare provider as advised.  When to seek medical advice  Call your healthcare provider right away if any of these occur:    Return of gout symptoms, usually at night:    Severe pain, swelling, and heat in a joint, especially the base of the big toe    Affected joint is hard to move    Skin of the affected joint is purple or red    Fever of 100.4 F (38 C) or higher    Pain that doesn't get better even with prescribed medicine   Date Last Reviewed: 1/12/2016 2000-2017 [employer]. [address] [place] [address]. All rights reserved. This information is not intended as a substitute for professional medical care. Always follow your healthcare professional's instructions.          Treating Gout Attacks     Raising the joint above the level of your heart can help reduce gout symptoms.     Gout is a disease that affects the joints. It is caused by excess uric acid in your blood stream that may lead to crystals forming in your joints. Left untreated, it can lead to painful foot and joint deformities and even kidney problems. But, by treating gout early, you can relieve pain and help prevent future problems. Gout can usually be treated with medication and proper diet. In severe cases, surgery may be needed.  Gout attacks are painful and often happen more than once. Taking medications may reduce pain and prevent attacks in the future. There are also some things you can do at home to relieve symptoms.  Medications for gout  Your healthcare provider may prescribe a daily medication to reduce levels of uric acid. Reducing your uric acid levels may help prevent gout attacks. Allopurinol is one commonly used medication taken daily to reduce uric acid levels. Other medications can help relieve pain and swelling during an acute attack. Medicines such as NSAIDs (nonsteroidal anti-inflammatory medicines),  steroids, and colchicine may be prescribed for intermittent use to relieve an acute gout attack. Be sure to take your medication as directed.  What you can do  Below are some things you can do at home to relieve gout symptoms. Your healthcare provider may have other tips.    Rest the painful joint as much as you can.    Raise the painful joint so it is at a level higher than your heart.    Use ice for 10 minutes every 1-2 hours as possible.  How can I prevent gout?  With a little effort, you may be able to prevent gout attacks in the future. Here are some things you can do:    Avoid foods high in purines    Certain meats (red meat, processed meat, turkey)    Organ meats (kidney, liver, sweetbread)    Shellfish (lobster, crab, shrimp, scallop, mussel)    Certain fish (anchovy, sardine, herring, mackerel)    Take any medications prescribed by your healthcare provider.    Lose weight if you need to.    Reduce high fructose corn syrup in meals and drinks.    Reduce or eliminate consumption of alcohol, particularly beer, but also red wine and spirits.    Control blood pressure, diabetes, and cholesterol.    Drink plenty of water to help flush uric acid from your body.  Date Last Reviewed: 2/1/2016 2000-2017 [employer]. [address] [place] [address], [place] [address]. All rights reserved. This information is not intended as a substitute for professional medical care. Always follow your healthcare professional's instructions.          Future Appointments        Provider Department Dept Phone Center    8/2/2017 1:00 PM [place] [facility], [facility] [phone] [facility]    8/4/2017 1:00 PM [place] [facility], [facility] [phone] [facility]    8/7/2017 1:00 PM [place] [facility], [facility] [phone] [facility]    8/9/2017 1:00 PM [place] [facility], [facility] [phone] [facility]     8/11/2017 1:00 PM [facility], [facility] [phone] [facility]    8/14/2017 1:00 PM [facility], [facility] [phone] [facility]    8/16/2017 1:00 PM [facility], [facility] [phone] [facility]    8/18/2017 1:00 PM [facility], [facility] [phone] [facility]    8/21/2017 1:00 PM [facility], [facility] [place] [facility] [phone] [facility]    8/23/2017 1:00 PM [facility], [facility] [phone] [facility]    8/25/2017 1:00 PM [facility], [facility] [phone] [facility]    8/28/2017 1:00 PM [facility], [facility] [phone] [facility]    8/30/2017 1:00 PM [facility], [facility] [phone] [facility]    9/1/2017 1:00 PM [facility], [facility] [phone] [facility]    9/6/2017 1:00 PM [facility], [facility] [phone] [facility]    9/8/2017 1:00 PM [facility], [facility] [phone] [facility]    9/11/2017 1:00 PM [facility], [facility] [phone] [facility]    9/13/2017 1:00 PM [facility], [facility] [phone] [facility]    9/15/2017 1:00 PM [facility], [facility] [phone] [facility]      24 Hour Appointment Hotline       To make an appointment at any [facility] clinic, call [phone] ([phone]). If you don't have a family doctor or clinic, we will help you find one. [facility] clinics are conveniently located to serve the needs of you and your family.             Review of your medicines      START taking        Dose / Directions Last dose taken     HYDROcodone-acetaminophen 5-325 MG per tablet   Commonly known as:  NORCO   Quantity:  20 tablet        1-2 tabs po q 4-6 hrs. prn pain   Refills:  0        methylPREDNISolone 4 MG tablet   Commonly known as:  MEDROL DOSEPAK   Quantity:  21 tablet        Follow package instructions   Refills:  0          Our records show that you are taking the medicines listed below. If these are incorrect, please call your family doctor or clinic.        Dose / Directions Last dose taken    ASPIRIN PO   Dose:  325 mg        Take 325 mg by mouth daily   Refills:  0        BOSUTINIB PO   Dose:  400 mg   Indication:  [place] Chromosome + Chronic Myelocytic Leukemia        Take 400 mg by mouth daily   Refills:  0        COMPAZINE PO   Dose:  10 mg        Take 10 mg by mouth every 6 hours as needed for nausea   Refills:  0        FLONASE 50 MCG/DOSE nasal spray   Quantity:  one         INHALE 2 SPRAYS IN EACH NOSTRIL ONCE DAILY   Refills:  0        LOPID PO   Dose:  600 mg        Take 600 mg by mouth 2 times daily (before meals)   Refills:  0        METFORMIN HCL PO   Dose:  500 mg        Take 500 mg by mouth 2 times daily (with meals)   Refills:  0        OMEGA-3 FISH OIL PO   Dose:  2 g        Take 2 g by mouth daily   Refills:  0        psyllium 0.52 G capsule   Dose:  1 capsule        Take 1 capsule by mouth daily   Refills:  0        selenium sulfide 2.5 % lotion   Commonly known as:  SELSUN        Apply topically daily as needed for itching or irritation   Refills:  0                Prescriptions were sent or printed at these locations (2 Prescriptions)                   Other Prescriptions                Printed at Department/Unit printer (2 of 2)         methylPREDNISolone (MEDROL DOSEPAK) 4 MG tablet               HYDROcodone-acetaminophen (NORCO) 5-325 MG per tablet                Orders Needing Specimen Collection     None      Pending Results     No orders found from 7/28/2017 to 7/31/2017.            Pending Culture  "Results     No orders found from 2017 to 2017.            Pending Results Instructions     If you had any lab results that were not finalized at the time of your Discharge, you can call the ED Lab Result RN at [phone]. You will be contacted by this team for any positive Lab results or changes in treatment. The nurses are available 7 days a week from 10A to 6:30P.  You can leave a message 24 hours per day and they will return your call.        Test Results From Your Hospital Stay               Thank you for choosing [facility]       Thank you for choosing [facility] for your care. Our goal is always to provide you with excellent care. Hearing back from our patients is one way we can continue to improve our services. Please take a few minutes to complete the written survey that you may receive in the mail after you visit with us. Thank you!        [employer] Information     [employer] lets you send messages to your doctor, view your test results, renew your prescriptions, schedule appointments and more. To sign up, go to [email or web address] . Click on \"Log in\" on the left side of the screen, which will take you to the Welcome page. Then click on \"Sign up Now\" on the right side of the page.     You will be asked to enter the access code listed below, as well as some personal information. Please follow the directions to create your username and password.     Your access code is: [email or web address]  Expires: 2017  5:48 PM     Your access code will  in 90 days. If you need help or a new code, please call your [facility] clinic or [phone].        Care EveryWhere ID     This is your Care EveryWhere ID. This could be used by other organizations to access your [facility] medical records  [id number]        Equal Access to Services     LIANA HARTMAN : Jeremiah Davey, dimitris centeno, riky moncada. So [facility] [phone].    ATENCIÓN: Si habla " español, tiene a smith disposición servicios gratuitos de asistencia lingüística. Llerin al [phone].    We comply with applicable federal civil rights laws and [place] laws. We do not discriminate on the basis of race, color, national origin, age, disability sex, sexual orientation or gender identity.            After Visit Summary       This is your record. Keep this with you and show to your community pharmacist(s) and doctor(s) at your next visit.

## 2022-01-31 DIAGNOSIS — I73.9 PERIPHERAL VASCULAR DISEASE, UNSPECIFIED (H): ICD-10-CM

## 2022-01-31 DIAGNOSIS — I73.9 PERIPHERAL VASCULAR DISEASE, UNSPECIFIED (H): Primary | ICD-10-CM

## 2022-01-31 DIAGNOSIS — H25.10 SENILE NUCLEAR SCLEROSIS: ICD-10-CM

## 2022-01-31 DIAGNOSIS — J30.9 ALLERGIC RHINITIS: Primary | ICD-10-CM

## 2022-01-31 DIAGNOSIS — F17.200 TOBACCO USE DISORDER: ICD-10-CM

## 2022-02-23 ENCOUNTER — LAB (OUTPATIENT)
Dept: LAB | Facility: CLINIC | Age: 72
End: 2022-02-23
Attending: INTERNAL MEDICINE
Payer: COMMERCIAL

## 2022-02-23 DIAGNOSIS — I73.9 PERIPHERAL VASCULAR DISEASE, UNSPECIFIED (H): ICD-10-CM

## 2022-02-23 LAB
BASOPHILS # BLD AUTO: 0 10E3/UL (ref 0–0.2)
BASOPHILS NFR BLD AUTO: 0 %
EOSINOPHIL # BLD AUTO: 0.5 10E3/UL (ref 0–0.7)
EOSINOPHIL NFR BLD AUTO: 7 %
ERYTHROCYTE [DISTWIDTH] IN BLOOD BY AUTOMATED COUNT: 13.9 % (ref 10–15)
HCT VFR BLD AUTO: 42.6 % (ref 40–53)
HGB BLD-MCNC: 13.9 G/DL (ref 13.3–17.7)
INR BLD: 2.6 (ref 0.9–1.1)
LYMPHOCYTES # BLD AUTO: 1.3 10E3/UL (ref 0.8–5.3)
LYMPHOCYTES NFR BLD AUTO: 18 %
MCH RBC QN AUTO: 33.1 PG (ref 26.5–33)
MCHC RBC AUTO-ENTMCNC: 32.6 G/DL (ref 31.5–36.5)
MCV RBC AUTO: 101 FL (ref 78–100)
MONOCYTES # BLD AUTO: 0.6 10E3/UL (ref 0–1.3)
MONOCYTES NFR BLD AUTO: 9 %
NEUTROPHILS # BLD AUTO: 4.6 10E3/UL (ref 1.6–8.3)
NEUTROPHILS NFR BLD AUTO: 66 %
PLATELET # BLD AUTO: 105 10E3/UL (ref 150–450)
RBC # BLD AUTO: 4.2 10E6/UL (ref 4.4–5.9)
WBC # BLD AUTO: 7.1 10E3/UL (ref 4–11)

## 2022-02-23 PROCEDURE — 85025 COMPLETE CBC W/AUTO DIFF WBC: CPT

## 2022-02-23 PROCEDURE — 85610 PROTHROMBIN TIME: CPT

## 2022-02-23 PROCEDURE — 36416 COLLJ CAPILLARY BLOOD SPEC: CPT

## 2022-03-23 ENCOUNTER — LAB (OUTPATIENT)
Dept: LAB | Facility: CLINIC | Age: 72
End: 2022-03-23
Payer: COMMERCIAL

## 2022-03-23 DIAGNOSIS — I73.9 PERIPHERAL VASCULAR DISEASE, UNSPECIFIED (H): ICD-10-CM

## 2022-03-23 LAB — INR BLD: 2.6 (ref 0.9–1.1)

## 2022-03-23 PROCEDURE — 85610 PROTHROMBIN TIME: CPT

## 2022-03-23 PROCEDURE — 36416 COLLJ CAPILLARY BLOOD SPEC: CPT

## 2022-04-20 ENCOUNTER — LAB (OUTPATIENT)
Dept: LAB | Facility: CLINIC | Age: 72
End: 2022-04-20
Payer: COMMERCIAL

## 2022-04-20 DIAGNOSIS — I73.9 PERIPHERAL VASCULAR DISEASE, UNSPECIFIED (H): ICD-10-CM

## 2022-04-20 LAB
BASOPHILS # BLD MANUAL: 0.1 10E3/UL (ref 0–0.2)
BASOPHILS NFR BLD MANUAL: 1 %
EOSINOPHIL # BLD MANUAL: 0.5 10E3/UL (ref 0–0.7)
EOSINOPHIL NFR BLD MANUAL: 7 %
ERYTHROCYTE [DISTWIDTH] IN BLOOD BY AUTOMATED COUNT: 14.3 % (ref 10–15)
HCT VFR BLD AUTO: 43.4 % (ref 40–53)
HGB BLD-MCNC: 15.4 G/DL (ref 13.3–17.7)
INR BLD: 3.1 (ref 0.9–1.1)
LYMPHOCYTES # BLD MANUAL: 1.2 10E3/UL (ref 0.8–5.3)
LYMPHOCYTES NFR BLD MANUAL: 16 %
MCH RBC QN AUTO: 33.4 PG (ref 26.5–33)
MCHC RBC AUTO-ENTMCNC: 35.5 G/DL (ref 31.5–36.5)
MCV RBC AUTO: 94 FL (ref 78–100)
MONOCYTES # BLD MANUAL: 0.8 10E3/UL (ref 0–1.3)
MONOCYTES NFR BLD MANUAL: 10 %
NEUTROPHILS # BLD MANUAL: 5.1 10E3/UL (ref 1.6–8.3)
NEUTROPHILS NFR BLD MANUAL: 66 %
PLAT MORPH BLD: ABNORMAL
PLATELET # BLD AUTO: 150 10E3/UL (ref 150–450)
POLYCHROMASIA BLD QL SMEAR: SLIGHT
RBC # BLD AUTO: 4.61 10E6/UL (ref 4.4–5.9)
RBC MORPH BLD: ABNORMAL
WBC # BLD AUTO: 7.7 10E3/UL (ref 4–11)

## 2022-04-20 PROCEDURE — 85610 PROTHROMBIN TIME: CPT

## 2022-04-20 PROCEDURE — 85027 COMPLETE CBC AUTOMATED: CPT

## 2022-04-20 PROCEDURE — 36416 COLLJ CAPILLARY BLOOD SPEC: CPT

## 2022-05-04 ENCOUNTER — LAB (OUTPATIENT)
Dept: LAB | Facility: CLINIC | Age: 72
End: 2022-05-04
Payer: COMMERCIAL

## 2022-05-04 DIAGNOSIS — I73.9 PERIPHERAL VASCULAR DISEASE, UNSPECIFIED (H): ICD-10-CM

## 2022-05-04 LAB — INR BLD: 2.7 (ref 0.9–1.1)

## 2022-05-04 PROCEDURE — 85610 PROTHROMBIN TIME: CPT

## 2022-05-04 PROCEDURE — 36416 COLLJ CAPILLARY BLOOD SPEC: CPT

## 2022-06-23 ENCOUNTER — LAB (OUTPATIENT)
Dept: LAB | Facility: CLINIC | Age: 72
End: 2022-06-23
Payer: COMMERCIAL

## 2022-06-23 DIAGNOSIS — I73.9 PERIPHERAL VASCULAR DISEASE, UNSPECIFIED (H): ICD-10-CM

## 2022-06-23 LAB — INR BLD: 1.9 (ref 0.9–1.1)

## 2022-06-23 PROCEDURE — 36416 COLLJ CAPILLARY BLOOD SPEC: CPT

## 2022-06-23 PROCEDURE — 85610 PROTHROMBIN TIME: CPT

## 2022-07-11 ENCOUNTER — LAB (OUTPATIENT)
Dept: LAB | Facility: CLINIC | Age: 72
End: 2022-07-11
Payer: COMMERCIAL

## 2022-07-11 DIAGNOSIS — I73.9 PERIPHERAL VASCULAR DISEASE, UNSPECIFIED (H): ICD-10-CM

## 2022-07-11 LAB — INR BLD: 2.9 (ref 0.9–1.1)

## 2022-07-11 PROCEDURE — 85610 PROTHROMBIN TIME: CPT

## 2022-07-11 PROCEDURE — 36416 COLLJ CAPILLARY BLOOD SPEC: CPT

## 2022-08-08 ENCOUNTER — LAB (OUTPATIENT)
Dept: LAB | Facility: CLINIC | Age: 72
End: 2022-08-08
Payer: COMMERCIAL

## 2022-08-08 DIAGNOSIS — I73.9 PERIPHERAL VASCULAR DISEASE, UNSPECIFIED (H): ICD-10-CM

## 2022-08-08 LAB — INR BLD: 2.8 (ref 0.9–1.1)

## 2022-08-08 PROCEDURE — 36416 COLLJ CAPILLARY BLOOD SPEC: CPT

## 2022-08-08 PROCEDURE — 85610 PROTHROMBIN TIME: CPT

## 2022-09-12 ENCOUNTER — LAB (OUTPATIENT)
Dept: LAB | Facility: CLINIC | Age: 72
End: 2022-09-12
Payer: COMMERCIAL

## 2022-09-12 DIAGNOSIS — I73.9 PERIPHERAL VASCULAR DISEASE, UNSPECIFIED (H): ICD-10-CM

## 2022-09-12 LAB — INR BLD: 2.7 (ref 0.9–1.1)

## 2022-09-12 PROCEDURE — 36415 COLL VENOUS BLD VENIPUNCTURE: CPT

## 2022-09-12 PROCEDURE — 85610 PROTHROMBIN TIME: CPT

## 2022-10-24 ENCOUNTER — LAB (OUTPATIENT)
Dept: LAB | Facility: CLINIC | Age: 72
End: 2022-10-24
Payer: COMMERCIAL

## 2022-10-24 DIAGNOSIS — I73.9 PERIPHERAL VASCULAR DISEASE, UNSPECIFIED (H): ICD-10-CM

## 2022-10-24 LAB — INR BLD: 2.8 (ref 0.9–1.1)

## 2022-10-24 PROCEDURE — 85610 PROTHROMBIN TIME: CPT

## 2022-10-24 PROCEDURE — 36416 COLLJ CAPILLARY BLOOD SPEC: CPT

## 2022-12-06 ENCOUNTER — LAB (OUTPATIENT)
Dept: LAB | Facility: CLINIC | Age: 72
End: 2022-12-06
Payer: COMMERCIAL

## 2022-12-06 DIAGNOSIS — I73.9 PERIPHERAL VASCULAR DISEASE, UNSPECIFIED (H): ICD-10-CM

## 2022-12-06 LAB — INR BLD: 2.1 (ref 0.9–1.1)

## 2022-12-06 PROCEDURE — 85610 PROTHROMBIN TIME: CPT

## 2022-12-06 PROCEDURE — 36416 COLLJ CAPILLARY BLOOD SPEC: CPT

## 2023-01-10 ENCOUNTER — LAB (OUTPATIENT)
Dept: LAB | Facility: CLINIC | Age: 73
End: 2023-01-10
Payer: COMMERCIAL

## 2023-01-10 DIAGNOSIS — I73.9 PERIPHERAL VASCULAR DISEASE, UNSPECIFIED (H): ICD-10-CM

## 2023-01-10 LAB — INR BLD: 2.3 (ref 0.9–1.1)

## 2023-01-10 PROCEDURE — 85610 PROTHROMBIN TIME: CPT

## 2023-01-10 PROCEDURE — 36416 COLLJ CAPILLARY BLOOD SPEC: CPT

## 2023-01-30 ENCOUNTER — LAB (OUTPATIENT)
Dept: LAB | Facility: CLINIC | Age: 73
End: 2023-01-30
Payer: COMMERCIAL

## 2023-01-30 DIAGNOSIS — I73.9 PERIPHERAL VASCULAR DISEASE, UNSPECIFIED (H): ICD-10-CM

## 2023-01-30 LAB — INR BLD: 3.1 (ref 0.9–1.1)

## 2023-01-30 PROCEDURE — 36416 COLLJ CAPILLARY BLOOD SPEC: CPT

## 2023-01-30 PROCEDURE — 85610 PROTHROMBIN TIME: CPT

## 2023-02-13 ENCOUNTER — LAB (OUTPATIENT)
Dept: LAB | Facility: CLINIC | Age: 73
End: 2023-02-13
Payer: COMMERCIAL

## 2023-02-13 DIAGNOSIS — I73.9 PERIPHERAL VASCULAR DISEASE, UNSPECIFIED (H): ICD-10-CM

## 2023-02-13 LAB — INR BLD: 2.4 (ref 0.9–1.1)

## 2023-02-13 PROCEDURE — 36416 COLLJ CAPILLARY BLOOD SPEC: CPT

## 2023-02-13 PROCEDURE — 85610 PROTHROMBIN TIME: CPT

## 2023-02-17 DIAGNOSIS — I74.5 EMBOLISM AND THROMBOSIS OF ILIAC ARTERY (H): ICD-10-CM

## 2023-02-17 DIAGNOSIS — I77.9 PAOD (PERIPHERAL ARTERIAL OCCLUSIVE DISEASE) (H): ICD-10-CM

## 2023-02-17 DIAGNOSIS — I73.9: Primary | ICD-10-CM

## 2023-03-13 ENCOUNTER — LAB (OUTPATIENT)
Dept: LAB | Facility: CLINIC | Age: 73
End: 2023-03-13
Payer: COMMERCIAL

## 2023-03-13 DIAGNOSIS — I74.5 EMBOLISM AND THROMBOSIS OF ILIAC ARTERY (H): ICD-10-CM

## 2023-03-13 DIAGNOSIS — I77.9 PAOD (PERIPHERAL ARTERIAL OCCLUSIVE DISEASE) (H): ICD-10-CM

## 2023-03-13 DIAGNOSIS — I73.9: ICD-10-CM

## 2023-03-13 LAB
HCT VFR BLD AUTO: 43.9 % (ref 40–53)
HGB BLD-MCNC: 14.6 G/DL (ref 13.3–17.7)
INR PPP: 2.14 (ref 0.85–1.15)
PLATELET # BLD AUTO: 148 10E3/UL (ref 150–450)

## 2023-03-13 PROCEDURE — 85018 HEMOGLOBIN: CPT

## 2023-03-13 PROCEDURE — 36415 COLL VENOUS BLD VENIPUNCTURE: CPT

## 2023-03-13 PROCEDURE — 85014 HEMATOCRIT: CPT

## 2023-03-13 PROCEDURE — 85610 PROTHROMBIN TIME: CPT

## 2023-03-13 PROCEDURE — 85049 AUTOMATED PLATELET COUNT: CPT

## 2023-05-08 ENCOUNTER — LAB (OUTPATIENT)
Dept: LAB | Facility: CLINIC | Age: 73
End: 2023-05-08
Payer: COMMERCIAL

## 2023-05-08 DIAGNOSIS — I73.9 PERIPHERAL VASCULAR DISEASE, UNSPECIFIED (H): ICD-10-CM

## 2023-05-08 LAB — INR BLD: 1.9 (ref 0.9–1.1)

## 2023-05-08 PROCEDURE — 85610 PROTHROMBIN TIME: CPT

## 2023-05-08 PROCEDURE — 36416 COLLJ CAPILLARY BLOOD SPEC: CPT

## 2023-05-11 DIAGNOSIS — Z79.899 HIGH RISK MEDICATION USE: ICD-10-CM

## 2023-05-11 DIAGNOSIS — I73.9 PERIPHERAL VASCULAR DISEASE, UNSPECIFIED (H): Primary | ICD-10-CM

## 2023-05-22 ENCOUNTER — LAB (OUTPATIENT)
Dept: LAB | Facility: CLINIC | Age: 73
End: 2023-05-22
Payer: COMMERCIAL

## 2023-05-22 DIAGNOSIS — I73.9 PERIPHERAL VASCULAR DISEASE, UNSPECIFIED (H): ICD-10-CM

## 2023-05-22 DIAGNOSIS — Z79.899 HIGH RISK MEDICATION USE: ICD-10-CM

## 2023-05-22 LAB — INR PPP: 2.01 (ref 0.85–1.15)

## 2023-05-22 PROCEDURE — 85610 PROTHROMBIN TIME: CPT

## 2023-05-22 PROCEDURE — 36415 COLL VENOUS BLD VENIPUNCTURE: CPT

## 2023-06-26 ENCOUNTER — LAB (OUTPATIENT)
Dept: LAB | Facility: CLINIC | Age: 73
End: 2023-06-26
Payer: COMMERCIAL

## 2023-06-26 DIAGNOSIS — I73.9 PERIPHERAL VASCULAR DISEASE, UNSPECIFIED (H): ICD-10-CM

## 2023-06-26 DIAGNOSIS — Z79.899 HIGH RISK MEDICATION USE: ICD-10-CM

## 2023-06-26 LAB — INR PPP: 2.45 (ref 0.85–1.15)

## 2023-06-26 PROCEDURE — 85610 PROTHROMBIN TIME: CPT

## 2023-06-26 PROCEDURE — 36415 COLL VENOUS BLD VENIPUNCTURE: CPT

## 2023-08-07 ENCOUNTER — LAB (OUTPATIENT)
Dept: LAB | Facility: CLINIC | Age: 73
End: 2023-08-07
Payer: COMMERCIAL

## 2023-08-07 DIAGNOSIS — Z79.899 HIGH RISK MEDICATION USE: ICD-10-CM

## 2023-08-07 DIAGNOSIS — I73.9 PERIPHERAL VASCULAR DISEASE, UNSPECIFIED (H): ICD-10-CM

## 2023-08-07 LAB — INR PPP: 2.58 (ref 0.85–1.15)

## 2023-08-07 PROCEDURE — 85610 PROTHROMBIN TIME: CPT

## 2023-08-07 PROCEDURE — 36415 COLL VENOUS BLD VENIPUNCTURE: CPT

## 2023-09-18 ENCOUNTER — LAB (OUTPATIENT)
Dept: LAB | Facility: CLINIC | Age: 73
End: 2023-09-18
Payer: COMMERCIAL

## 2023-09-18 DIAGNOSIS — I73.9 PERIPHERAL VASCULAR DISEASE, UNSPECIFIED (H): ICD-10-CM

## 2023-09-18 DIAGNOSIS — Z79.899 HIGH RISK MEDICATION USE: ICD-10-CM

## 2023-09-18 LAB
HCT VFR BLD AUTO: 42 % (ref 40–53)
HGB BLD-MCNC: 14.1 G/DL (ref 13.3–17.7)
INR PPP: 2.4 (ref 0.85–1.15)
PLATELET # BLD AUTO: 122 10E3/UL (ref 150–450)

## 2023-09-18 PROCEDURE — 85610 PROTHROMBIN TIME: CPT

## 2023-09-18 PROCEDURE — 85018 HEMOGLOBIN: CPT

## 2023-09-18 PROCEDURE — 85014 HEMATOCRIT: CPT

## 2023-09-18 PROCEDURE — 85049 AUTOMATED PLATELET COUNT: CPT

## 2023-09-18 PROCEDURE — 36415 COLL VENOUS BLD VENIPUNCTURE: CPT

## 2023-10-30 ENCOUNTER — LAB (OUTPATIENT)
Dept: LAB | Facility: CLINIC | Age: 73
End: 2023-10-30
Payer: COMMERCIAL

## 2023-10-30 DIAGNOSIS — Z79.899 HIGH RISK MEDICATION USE: ICD-10-CM

## 2023-10-30 DIAGNOSIS — I73.9 PERIPHERAL VASCULAR DISEASE, UNSPECIFIED (H): ICD-10-CM

## 2023-10-30 LAB
HCT VFR BLD AUTO: 44.1 % (ref 40–53)
HGB BLD-MCNC: 14.3 G/DL (ref 13.3–17.7)
INR PPP: 2.62 (ref 0.85–1.15)
PLATELET # BLD AUTO: 129 10E3/UL (ref 150–450)

## 2023-10-30 PROCEDURE — 85014 HEMATOCRIT: CPT

## 2023-10-30 PROCEDURE — 85049 AUTOMATED PLATELET COUNT: CPT

## 2023-10-30 PROCEDURE — 85018 HEMOGLOBIN: CPT

## 2023-10-30 PROCEDURE — 36415 COLL VENOUS BLD VENIPUNCTURE: CPT

## 2023-10-30 PROCEDURE — 85610 PROTHROMBIN TIME: CPT

## 2024-01-10 ENCOUNTER — LAB (OUTPATIENT)
Dept: LAB | Facility: CLINIC | Age: 74
End: 2024-01-10
Payer: COMMERCIAL

## 2024-01-10 DIAGNOSIS — Z79.899 HIGH RISK MEDICATION USE: ICD-10-CM

## 2024-01-10 DIAGNOSIS — I73.9 PERIPHERAL VASCULAR DISEASE, UNSPECIFIED (H): ICD-10-CM

## 2024-01-10 LAB
HCT VFR BLD AUTO: 41.5 % (ref 40–53)
HGB BLD-MCNC: 13.7 G/DL (ref 13.3–17.7)
INR PPP: 2.45 (ref 0.85–1.15)
PLATELET # BLD AUTO: 132 10E3/UL (ref 150–450)

## 2024-01-10 PROCEDURE — 85610 PROTHROMBIN TIME: CPT

## 2024-01-10 PROCEDURE — 85018 HEMOGLOBIN: CPT

## 2024-01-10 PROCEDURE — 85014 HEMATOCRIT: CPT

## 2024-01-10 PROCEDURE — 85049 AUTOMATED PLATELET COUNT: CPT

## 2024-01-10 PROCEDURE — 36415 COLL VENOUS BLD VENIPUNCTURE: CPT

## 2024-02-28 ENCOUNTER — LAB (OUTPATIENT)
Dept: LAB | Facility: CLINIC | Age: 74
End: 2024-02-28
Payer: COMMERCIAL

## 2024-02-28 DIAGNOSIS — I73.9 PERIPHERAL VASCULAR DISEASE, UNSPECIFIED (H): ICD-10-CM

## 2024-02-28 DIAGNOSIS — Z79.899 HIGH RISK MEDICATION USE: ICD-10-CM

## 2024-02-28 LAB — INR PPP: 2.06 (ref 0.85–1.15)

## 2024-02-28 PROCEDURE — 36415 COLL VENOUS BLD VENIPUNCTURE: CPT

## 2024-02-28 PROCEDURE — 85610 PROTHROMBIN TIME: CPT

## 2024-06-05 ENCOUNTER — LAB (OUTPATIENT)
Dept: LAB | Facility: CLINIC | Age: 74
End: 2024-06-05
Payer: COMMERCIAL

## 2024-06-05 DIAGNOSIS — I73.9 PERIPHERAL VASCULAR DISEASE, UNSPECIFIED (H): ICD-10-CM

## 2024-06-05 DIAGNOSIS — Z79.899 HIGH RISK MEDICATION USE: ICD-10-CM

## 2024-06-05 LAB
HCT VFR BLD AUTO: 42.4 % (ref 40–53)
HGB BLD-MCNC: 14 G/DL (ref 13.3–17.7)
INR PPP: 1.79 (ref 0.85–1.15)
PLATELET # BLD AUTO: 144 10E3/UL (ref 150–450)

## 2024-06-05 PROCEDURE — 85018 HEMOGLOBIN: CPT | Mod: GZ

## 2024-06-05 PROCEDURE — 85610 PROTHROMBIN TIME: CPT

## 2024-06-05 PROCEDURE — 36415 COLL VENOUS BLD VENIPUNCTURE: CPT

## 2024-06-05 PROCEDURE — 85014 HEMATOCRIT: CPT | Mod: GZ

## 2024-06-05 PROCEDURE — 85049 AUTOMATED PLATELET COUNT: CPT | Mod: GZ

## 2024-06-25 DIAGNOSIS — Z79.899 HIGH RISK MEDICATION USE: Primary | ICD-10-CM

## 2024-06-25 DIAGNOSIS — I73.9 PERIPHERAL VASCULAR DISEASE, UNSPECIFIED (H): ICD-10-CM

## 2024-06-25 DIAGNOSIS — I73.9: ICD-10-CM

## 2024-07-02 DIAGNOSIS — I73.9 PERIPHERAL VASCULAR DISEASE, UNSPECIFIED (H): ICD-10-CM

## 2024-07-02 DIAGNOSIS — Z79.899 HIGH RISK MEDICATION USE: Primary | ICD-10-CM

## 2024-07-18 ENCOUNTER — LAB (OUTPATIENT)
Dept: LAB | Facility: CLINIC | Age: 74
End: 2024-07-18
Payer: COMMERCIAL

## 2024-07-18 DIAGNOSIS — I73.9 PERIPHERAL VASCULAR DISEASE, UNSPECIFIED (H): ICD-10-CM

## 2024-07-18 DIAGNOSIS — Z79.899 HIGH RISK MEDICATION USE: ICD-10-CM

## 2024-07-18 LAB — INR BLD: 2.1 (ref 0.9–1.1)

## 2024-07-18 PROCEDURE — 36416 COLLJ CAPILLARY BLOOD SPEC: CPT | Performed by: INTERNAL MEDICINE

## 2024-07-18 PROCEDURE — 85610 PROTHROMBIN TIME: CPT | Performed by: INTERNAL MEDICINE

## 2024-08-22 ENCOUNTER — TELEPHONE (OUTPATIENT)
Dept: DERMATOLOGY | Facility: CLINIC | Age: 74
End: 2024-08-22
Payer: COMMERCIAL

## 2024-08-22 NOTE — TELEPHONE ENCOUNTER
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 No    Provider: José Miguel Jin MD    Call Attempt:  Records received via Fax

## 2024-08-22 NOTE — LETTER
August 23, 2024      Phil Fam  [address]  [place] [address]        Dear Phil,       You are scheduled for Mohs Surgery on  9/18/2024 at 730 AM.       We are located at the [facility] in [place]. Please check in at the Dermatology Clinic located on the 2nd floor at the end of the valentine.    You don't need to arrive more than 5-10 minutes prior to your appointment time.    Be sure to eat a good breakfast and bathe and wash your hair prior to Surgery.   If you are taking any anti-coagulants that are prescribed by your Doctor (such as Coumadin/warfarin, Plavix, Aspirin, Ibuprofen), please continue taking them.    However, If you are taking anti-coagulants over the counter without a Doctor's order for a Medical condition, please discontinue them 10 days prior to Surgery.      Please wear comfortable clothing as you could possibly be in the clinic for 4-6 hours for your surgery.             Sincerely,        José Miguel Jin MD

## 2024-08-23 NOTE — TELEPHONE ENCOUNTER
Called and spoke to pt and daughter and advised that they did not have to get an INR or stop coumadin prior to Mohs procedure. They verbalized understanding.  Lakeisha AMATO RN BSN PHN  Specialty Clinics

## 2024-08-23 NOTE — TELEPHONE ENCOUNTER
Called pt Based on Pathology, and Scheduled for Mohs with José Miguel Jin M.D. 9/18/2024.     RN:   Pt did have questions about if we need the INR drawn due to being on anticoagulants.. As he was required to get labs drawn for them.     Letter sent

## 2024-08-29 ENCOUNTER — LAB (OUTPATIENT)
Dept: LAB | Facility: CLINIC | Age: 74
End: 2024-08-29
Payer: COMMERCIAL

## 2024-08-29 DIAGNOSIS — Z79.899 HIGH RISK MEDICATION USE: ICD-10-CM

## 2024-08-29 DIAGNOSIS — I73.9 PERIPHERAL VASCULAR DISEASE, UNSPECIFIED (H): ICD-10-CM

## 2024-08-29 LAB — INR BLD: 2.1 (ref 0.9–1.1)

## 2024-08-29 PROCEDURE — 85610 PROTHROMBIN TIME: CPT

## 2024-08-29 PROCEDURE — 36416 COLLJ CAPILLARY BLOOD SPEC: CPT

## 2024-09-18 ENCOUNTER — OFFICE VISIT (OUTPATIENT)
Dept: DERMATOLOGY | Facility: CLINIC | Age: 74
End: 2024-09-18
Payer: COMMERCIAL

## 2024-09-18 DIAGNOSIS — D18.01 ANGIOMA OF SKIN: ICD-10-CM

## 2024-09-18 DIAGNOSIS — D23.9 DERMAL NEVUS: Primary | ICD-10-CM

## 2024-09-18 DIAGNOSIS — L81.4 LENTIGO: ICD-10-CM

## 2024-09-18 DIAGNOSIS — L82.1 SEBORRHEIC KERATOSES: ICD-10-CM

## 2024-09-18 DIAGNOSIS — C44.320 SQUAMOUS CELL CARCINOMA OF FACE: ICD-10-CM

## 2024-09-18 PROCEDURE — 17311 MOHS 1 STAGE H/N/HF/G: CPT | Performed by: DERMATOLOGY

## 2024-09-18 PROCEDURE — 13152 CMPLX RPR E/N/E/L 2.6-7.5 CM: CPT | Performed by: DERMATOLOGY

## 2024-09-18 PROCEDURE — 99203 OFFICE O/P NEW LOW 30 MIN: CPT | Mod: 25 | Performed by: DERMATOLOGY

## 2024-09-18 NOTE — PATIENT INSTRUCTIONS
Sutured Wound Care     [facility]: [phone]    [facility]: [phone]          No strenuous activity for 48 hours. Resume moderate activity in 48 hours. No heavy exercising until you are seen for follow up in one week.     Take Tylenol as needed for discomfort.                         Do not drink alcoholic beverages for 48 hours.     Keep the pressure bandage in place for 24 hours. (White Guaze and Tape)  If the bandage becomes blood tinged or loose, reinforce it with gauze and tape.        (Refer to the reverse side of this page for management of bleeding).    Remove pressure bandage in 24 hours     Leave the flat bandage in place (Brown Tape)  until your follow up appointment.2024    Keep the bandage dry. Wash around it carefully.    If the tape becomes soiled or starts to come off, reinforce it with additional paper tape.    Do not smoke for 3 weeks; smoking is detrimental to wound healing.    It is normal to have swelling and bruising around the surgical site. The bruising will fade in approximately 10-14 days. Elevate the area to reduce swelling.    Numbness, itchiness and sensitivity to temperature changes can occur after surgery and may take up to 18 months to normalize.      POSSIBLE COMPLICATIONS    BLEEDING:    Leave the bandage in place.  Use tightly rolled up gauze or a cloth to apply direct pressure over the bandage for 20   minutes.  Reapply pressure for an additional 20 minutes if necessary  Call the office or go to the nearest emergency room if pressure fails to stop the bleeding.  Use additional gauze and tape to maintain pressure once the bleeding has stopped.        PAIN:    Post operative pain should slowly get better, never worse.  A severe increase in pain may indicate a problem. Call the office if this occurs.    In case of emergency phone:Dr Jin [phone]

## 2024-09-18 NOTE — LETTER
9/18/2024      Phil Fam  [address]  [facility] [address]      Dear Colleague,    Thank you for referring your patient, Phil Fam, to the [facility]. Please see a copy of my visit note below.    Surgical Office Location :   [facility]  [address] [place] [address]      Phil Fam , a 74 year old year old male patient, I was asked to see by Dr. 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  Patient has no other skin complaints today.  Remainder of the HPI, Meds, PMH, Allergies, FH, and SH was reviewed in chart.      Past Medical History:   Diagnosis Date     CML (chronic myelocytic leukaemia)      Diabetes (H)      Duodenal ulcer, unspecified as acute or chronic, without hemorrhage, perforation, or obstruction      Other malaria        Past Surgical History:   Procedure Laterality Date     IR ABDOMINAL AORTOGRAM  4/26/2012     IR ABDOMINAL AORTOGRAM  7/12/2012     IR ABDOMINAL AORTOGRAM  9/5/2018     IR MISCELLANEOUS PROCEDURE  4/26/2012     IR MISCELLANEOUS PROCEDURE  4/26/2012     IR MISCELLANEOUS PROCEDURE  5/24/2012     IR MISCELLANEOUS PROCEDURE  7/12/2012     IR MISCELLANEOUS PROCEDURE  7/12/2012     IR MISCELLANEOUS PROCEDURE  7/13/2012     IR MISCELLANEOUS PROCEDURE  7/13/2012     IR MISCELLANEOUS PROCEDURE  7/14/2012     IR MISCELLANEOUS PROCEDURE  7/15/2012     PHACOEMULSIFICATION WITH STANDARD INTRAOCULAR LENS IMPLANT Right 2/12/2015    Procedure: PHACOEMULSIFICATION WITH STANDARD INTRAOCULAR LENS IMPLANT;  Surgeon: Chucky Barrera MD;  Location: [facility]     PHACOEMULSIFICATION WITH STANDARD INTRAOCULAR LENS IMPLANT Left 3/18/2015    Procedure: PHACOEMULSIFICATION WITH STANDARD INTRAOCULAR LENS IMPLANT;  Surgeon: Chucky Barrera MD;  Location: [facility] OR     SURGICAL HISTORY OF -       septoplasty     SURGICAL HISTORY OF -   07/18/2000    colonoscopy        Family History   Problem Relation Age of Onset     Diabetes  Mother      Cancer Father        Social History     Socioeconomic History     Marital status:      Spouse name: Not on file     Number of children: Not on file     Years of education: Not on file     Highest education level: Not on file   Occupational History     Not on file   Tobacco Use     Smoking status: Every Day     Current packs/day: 1.00     Average packs/day: 1 pack/day for 35.0 years (35.0 ttl pk-yrs)     Types: Cigarettes     Smokeless tobacco: Not on file   Substance and Sexual Activity     Alcohol use: Yes     Comment: socially     Drug use: No     Sexual activity: Never   Other Topics Concern     Parent/sibling w/ CABG, MI or angioplasty before 65F 55M? Not Asked   Social History Narrative     Not on file     Social Determinants of Health     Financial Resource Strain: Not on file   Food Insecurity: Not on file   Transportation Needs: Not on file   Physical Activity: Not on file   Stress: Not on file   Social Connections: Not on file   Interpersonal Safety: Not on file   Housing Stability: Not on file       Outpatient Encounter Medications as of 9/18/2024   Medication Sig Dispense Refill     acetaminophen (TYLENOL) 500 MG tablet Take 1,000 mg by mouth 3 times daily       aspirin 81 MG chewable tablet Take 81 mg by mouth daily       BOSUTINIB PO Take 400 mg by mouth daily       cilostazol (PLETAL) 100 MG tablet Take 100 mg by mouth 2 times daily       clopidogrel (PLAVIX) 75 MG tablet Take 75 mg by mouth daily       ferrous sulfate (IRON) 325 (65 FE) MG tablet Take 325 mg by mouth daily (with breakfast)       FLONASE INHA 50 MCG/DOSE NA INHALE 2 SPRAYS IN EACH NOSTRIL ONCE DAILY one 0     Gemfibrozil (LOPID PO) Take 600 mg by mouth 2 times daily (before meals)       HYDROcodone-acetaminophen (NORCO) 5-325 MG per tablet 1-2 tabs po q 4-6 hrs. prn pain 20 tablet 0     lidocaine (XYLOCAINE) 5 % ointment Apply topically as needed To left knee       METFORMIN HCL PO Take 500 mg by mouth 2 times daily  (with meals)       methylPREDNISolone (MEDROL DOSEPAK) 4 MG tablet Follow package instructions 21 tablet 0     Metoprolol Tartrate 37.5 MG TABS Take 37.5 mg by mouth 2 times daily       nitroGLYcerin (NITROSTAT) 0.4 MG sublingual tablet Place 0.4 mg under the tongue every 5 minutes as needed       Omega-3 Fatty Acids (OMEGA-3 FISH OIL PO) Take 2 g by mouth daily       Prochlorperazine Maleate (COMPAZINE PO) Take 10 mg by mouth every 6 hours as needed for nausea       psyllium 0.52 G capsule Take 1 capsule by mouth daily       selenium sulfide (SELSUN) 2.5 % shampoo Apply topically daily as needed for itching or irritation       senna-docusate (SENOKOT-S;PERICOLACE) 8.6-50 MG per tablet Take 1 tablet by mouth 2 times daily       Testosterone Cypionate & Prop 200-20 MG/ML SOLN 200 mg every 28 days       No facility-administered encounter medications on file as of 9/18/2024.             Review Of Systems  Skin: As above  Eyes: negative  Ears/Nose/Throat: negative  Respiratory: No shortness of breath, dyspnea on exertion, cough, or hemoptysis  Cardiovascular: negative  Gastrointestinal: negative  Genitourinary: negative  Musculoskeletal: negative  Neurologic: negative  Psychiatric: negative  Hematologic/Lymphatic/Immunologic: negative  Endocrine: negative      O:   NAD, WDWN, Alert & Oriented, Mood & Affect wnl, Vitals stable   General appearance demetrio ii   Vitals stable   Alert, oriented and in no acute distress   L medial brow 5mm scaly papule   Stuck on papules and brown macules on trunk and ext    Red papules on trunk   Flesh colored papules on trunk          Eyes: Conjunctivae/lids:Normal     ENT: Lips, mucosa: normal    MSK:Normal    Cardiovascular: peripheral edema none    Pulm: Breathing Normal    Lymph Nodes: No Head and Neck Lymphadenopathy     Neuro/Psych: Orientation:Normal; Mood/Affect:Normal      A/P:  1. Seborrheic keratosis, lentigo, angioma, dermal nevus  2. L medial brow squamous cell carcinoma   It was  a pleasure speaking to Phil Fam today.  Previous clinic  notes and pertinent laboratory tests were reviewed prior to Phil Fam's visit.  Signs and Symptoms of skin cancer discussed with patient.  Patient encouraged to perform monthly skin exams.  UV precautions reviewed with patient.  Risks of non-melanoma skin cancer discussed with patient   Return to clinic 6 months  PROCEDURE NOTE  L medial brow squamous cell carcinoma   MOHS:   Location    The rationale for Mohs surgery was discussed with the patient and consent was obtained.  The risks and benefits as well as alternatives to therapy were discussed, in detail.  Specifically, the risks of infection, scarring, bleeding, prolonged wound healing, incomplete removal, allergy to anesthesia, nerve injury and recurrence were addressed.  Indication for Mohs was Location. Prior to the procedure, the treatment site was clearly identified and, if available, confirmed with previous photos and confirmed by the patient   All components of the Universal Protocol/PAUSE rule were completed.  The Mohs surgeon operated in two distinct and integrated capacities as the surgeon and pathologist.      The area was prepped with Betasept.  A rim of normal appearing skin was marked circumferentially around the lesion.  The area was infiltrated with local anesthesia.  The tumor was first debulked to remove all clinically apparent tumor.  An incision following the standard Mohs approach was done and the specimen was oriented,mapped and placed in 1 block(s).  Each specimen was then chromacoded and processed in the Mohs laboratory using standard Mohs technique and submitted for frozen section histology.  Frozen section analysis showed no residual tumor but CLEAR MARGINS.      The tumor was excised using standard Mohs technique in 1 stages(s).  CLEAR MARGINS OBTAINED and Final defect size was 1 x 0.9 cm.     We discussed the options for wound management in full with the patient  including risks/benefits/ possible outcomes.      REPAIR COMPLEX: Because of the tightness of the surrounding skin and Because of the size and full thickness nature of the defect, Because of the tightness of the surrounding skin, To maintain form and function, In order to avoid distortion, and Because of the proximity to the brow, a complex closure was planned. After LE anesthesia and prep, Burow's triangles were excised in the relaxed skin tension lines. The wound edges were widely undermined greater than width of the defect on both sides by dissection in the subcutaneous plane until adequate tissue mobility was obtained. Hemostasis was obtained. The wound edges were closed in a layered fashion using Vicryl and Fast Absorbing Plain Gut sutures. Postoperative length was 3 cm.   EBL minimal; complications none; wound care routine.  The patient was discharged in good condition and will return in one week for wound evaluation.        Again, thank you for allowing me to participate in the care of your patient.        Sincerely,        José Miguel Jin MD

## 2024-09-18 NOTE — PROGRESS NOTES
Phil Fam , a 74 year old year old male patient, I was asked to see by Dr. 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  Patient has no other skin complaints today.  Remainder of the HPI, Meds, PMH, Allergies, FH, and SH was reviewed in chart.      Past Medical History:   Diagnosis Date    CML (chronic myelocytic leukaemia)     Diabetes (H)     Duodenal ulcer, unspecified as acute or chronic, without hemorrhage, perforation, or obstruction     Other malaria        Past Surgical History:   Procedure Laterality Date    IR ABDOMINAL AORTOGRAM  4/26/2012    IR ABDOMINAL AORTOGRAM  7/12/2012    IR ABDOMINAL AORTOGRAM  9/5/2018    IR MISCELLANEOUS PROCEDURE  4/26/2012    IR MISCELLANEOUS PROCEDURE  4/26/2012    IR MISCELLANEOUS PROCEDURE  5/24/2012    IR MISCELLANEOUS PROCEDURE  7/12/2012    IR MISCELLANEOUS PROCEDURE  7/12/2012    IR MISCELLANEOUS PROCEDURE  7/13/2012    IR MISCELLANEOUS PROCEDURE  7/13/2012    IR MISCELLANEOUS PROCEDURE  7/14/2012    IR MISCELLANEOUS PROCEDURE  7/15/2012    PHACOEMULSIFICATION WITH STANDARD INTRAOCULAR LENS IMPLANT Right 2/12/2015    Procedure: PHACOEMULSIFICATION WITH STANDARD INTRAOCULAR LENS IMPLANT;  Surgeon: Chucky Barrera MD;  Location: [facility]    PHACOEMULSIFICATION WITH STANDARD INTRAOCULAR LENS IMPLANT Left 3/18/2015    Procedure: PHACOEMULSIFICATION WITH STANDARD INTRAOCULAR LENS IMPLANT;  Surgeon: Chucky Barrera MD;  Location: [facility]    SURGICAL HISTORY OF -       septoplasty    SURGICAL HISTORY OF -   07/18/2000    colonoscopy        Family History   Problem Relation Age of Onset    Diabetes Mother     Cancer Father        Social History     Socioeconomic History    Marital status:      Spouse name: Not on file    Number of children: Not on file    Years of education: Not on file    Highest education level: Not on file   Occupational History    Not on file   Tobacco Use    Smoking status: Every Day     Current packs/day: 1.00     Average  packs/day: 1 pack/day for 35.0 years (35.0 ttl pk-yrs)     Types: Cigarettes    Smokeless tobacco: Not on file   Substance and Sexual Activity    Alcohol use: Yes     Comment: socially    Drug use: No    Sexual activity: Never   Other Topics Concern    Parent/sibling w/ CABG, MI or angioplasty before 65F 55M? Not Asked   Social History Narrative    Not on file     Social Determinants of Health     Financial Resource Strain: Not on file   Food Insecurity: Not on file   Transportation Needs: Not on file   Physical Activity: Not on file   Stress: Not on file   Social Connections: Not on file   Interpersonal Safety: Not on file   Housing Stability: Not on file       Outpatient Encounter Medications as of 9/18/2024   Medication Sig Dispense Refill    acetaminophen (TYLENOL) 500 MG tablet Take 1,000 mg by mouth 3 times daily      aspirin 81 MG chewable tablet Take 81 mg by mouth daily      BOSUTINIB PO Take 400 mg by mouth daily      cilostazol (PLETAL) 100 MG tablet Take 100 mg by mouth 2 times daily      clopidogrel (PLAVIX) 75 MG tablet Take 75 mg by mouth daily      ferrous sulfate (IRON) 325 (65 FE) MG tablet Take 325 mg by mouth daily (with breakfast)      FLONASE INHA 50 MCG/DOSE NA INHALE 2 SPRAYS IN EACH NOSTRIL ONCE DAILY one 0    Gemfibrozil (LOPID PO) Take 600 mg by mouth 2 times daily (before meals)      HYDROcodone-acetaminophen (NORCO) 5-325 MG per tablet 1-2 tabs po q 4-6 hrs. prn pain 20 tablet 0    lidocaine (XYLOCAINE) 5 % ointment Apply topically as needed To left knee      METFORMIN HCL PO Take 500 mg by mouth 2 times daily (with meals)      methylPREDNISolone (MEDROL DOSEPAK) 4 MG tablet Follow package instructions 21 tablet 0    Metoprolol Tartrate 37.5 MG TABS Take 37.5 mg by mouth 2 times daily      nitroGLYcerin (NITROSTAT) 0.4 MG sublingual tablet Place 0.4 mg under the tongue every 5 minutes as needed      Omega-3 Fatty Acids (OMEGA-3 FISH OIL PO) Take 2 g by mouth daily      Prochlorperazine  Maleate (COMPAZINE PO) Take 10 mg by mouth every 6 hours as needed for nausea      psyllium 0.52 G capsule Take 1 capsule by mouth daily      selenium sulfide (SELSUN) 2.5 % shampoo Apply topically daily as needed for itching or irritation      senna-docusate (SENOKOT-S;PERICOLACE) 8.6-50 MG per tablet Take 1 tablet by mouth 2 times daily      Testosterone Cypionate & Prop 200-20 MG/ML SOLN 200 mg every 28 days       No facility-administered encounter medications on file as of 9/18/2024.             Review Of Systems  Skin: As above  Eyes: negative  Ears/Nose/Throat: negative  Respiratory: No shortness of breath, dyspnea on exertion, cough, or hemoptysis  Cardiovascular: negative  Gastrointestinal: negative  Genitourinary: negative  Musculoskeletal: negative  Neurologic: negative  Psychiatric: negative  Hematologic/Lymphatic/Immunologic: negative  Endocrine: negative      O:   NAD, WDWN, Alert & Oriented, Mood & Affect wnl, Vitals stable   General appearance demetrio ii   Vitals stable   Alert, oriented and in no acute distress   L medial brow 5mm scaly papule   Stuck on papules and brown macules on trunk and ext    Red papules on trunk   Flesh colored papules on trunk          Eyes: Conjunctivae/lids:Normal     ENT: Lips, mucosa: normal    MSK:Normal    Cardiovascular: peripheral edema none    Pulm: Breathing Normal    Lymph Nodes: No Head and Neck Lymphadenopathy     Neuro/Psych: Orientation:Normal; Mood/Affect:Normal      A/P:  1. Seborrheic keratosis, lentigo, angioma, dermal nevus  2. L medial brow squamous cell carcinoma   It was a pleasure speaking to Phil Fam today.  Previous clinic  notes and pertinent laboratory tests were reviewed prior to Phli Fam's visit.  Signs and Symptoms of skin cancer discussed with patient.  Patient encouraged to perform monthly skin exams.  UV precautions reviewed with patient.  Risks of non-melanoma skin cancer discussed with patient   Return to clinic 6  months  PROCEDURE NOTE  L medial brow squamous cell carcinoma   MOHS:   Location    The rationale for Mohs surgery was discussed with the patient and consent was obtained.  The risks and benefits as well as alternatives to therapy were discussed, in detail.  Specifically, the risks of infection, scarring, bleeding, prolonged wound healing, incomplete removal, allergy to anesthesia, nerve injury and recurrence were addressed.  Indication for Mohs was Location. Prior to the procedure, the treatment site was clearly identified and, if available, confirmed with previous photos and confirmed by the patient   All components of the Universal Protocol/PAUSE rule were completed.  The Mohs surgeon operated in two distinct and integrated capacities as the surgeon and pathologist.      The area was prepped with Betasept.  A rim of normal appearing skin was marked circumferentially around the lesion.  The area was infiltrated with local anesthesia.  The tumor was first debulked to remove all clinically apparent tumor.  An incision following the standard Mohs approach was done and the specimen was oriented,mapped and placed in 1 block(s).  Each specimen was then chromacoded and processed in the Mohs laboratory using standard Mohs technique and submitted for frozen section histology.  Frozen section analysis showed no residual tumor but CLEAR MARGINS.      The tumor was excised using standard Mohs technique in 1 stages(s).  CLEAR MARGINS OBTAINED and Final defect size was 1 x 0.9 cm.     We discussed the options for wound management in full with the patient including risks/benefits/ possible outcomes.      REPAIR COMPLEX: Because of the tightness of the surrounding skin and Because of the size and full thickness nature of the defect, Because of the tightness of the surrounding skin, To maintain form and function, In order to avoid distortion, and Because of the proximity to the brow, a complex closure was planned. After LE  anesthesia and prep, Burow's triangles were excised in the relaxed skin tension lines. The wound edges were widely undermined greater than width of the defect on both sides by dissection in the subcutaneous plane until adequate tissue mobility was obtained. Hemostasis was obtained. The wound edges were closed in a layered fashion using Vicryl and Fast Absorbing Plain Gut sutures. Postoperative length was 3 cm.   EBL minimal; complications none; wound care routine.  The patient was discharged in good condition and will return in one week for wound evaluation.

## 2024-09-25 ENCOUNTER — ALLIED HEALTH/NURSE VISIT (OUTPATIENT)
Dept: DERMATOLOGY | Facility: CLINIC | Age: 74
End: 2024-09-25
Payer: COMMERCIAL

## 2024-09-25 DIAGNOSIS — Z48.01 ENCOUNTER FOR CHANGE OR REMOVAL OF SURGICAL WOUND DRESSING: Primary | ICD-10-CM

## 2024-09-25 PROCEDURE — 99207 PR NO CHARGE NURSE ONLY: CPT

## 2024-09-25 NOTE — PATIENT INSTRUCTIONS
Wound care instructions for  ONE WEEK AFTER SURGERY    Leave the bandage on for 1 week after today's bandage change.  In 1 week you may resume your regular skin care when you remove the dressing. This includes washing with mild soap and water, applying moisturizer, make-up and sunscreen.  If the wound is open or bleeding in any part of the incision/graft site, you should cover the area with a bandage daily as directed below:    Clean and dry area with plain water using a Q-tip or sterile gauze pad.  Apply vaseline, aquaphor, polysporin, or bacitracin ointment to the wound  Cover the wound with a band-aid or a sterile non-stick gauze pad and micropore paper tape.      Repeat the instructions above until wound is completely healed    If you notice that the scar is red and firm (after you remove the dressing) this is normal and will fade over time. It may take up to 6-12 months for this to occur.    Massaging the area helps the scar fade and soften quicker. Begin to massage the area one month after the dressings have been removed. Apply pressure directly and firmly over the scar with the fingertips and move in circular motions. You may massage up to 10 times daily, each time for 30 seconds.    It is normal for there to be a tender, pimple-like bump along the scar about 6-8 weeks after surgery. This sometimes happens as the scar matures and the stitches beneath begin to dissolve. Do not pick or squeeze. It will resolve in time. If an area of the wound does open and there appears to be drainage, you may apply one of the ointments listed in the above directions a few times every day until the wound is completely healed.    Numbness around the surgical site is normal. It can take up to 12-18 months for the feeling to return to normal. Itchiness, tingling, and occasional sharp pains might be present during this portion of the healing. All of these feelings will subside once the nerves have completely healed.      IN CASE OF  EMERGENCY: Dr Jin [phone]     If you were seen in [place] call: [phone]    If you were seen in [place] call: [phone]

## 2024-09-25 NOTE — PROGRESS NOTES
Phil Fam comes into clinic today at the request of Dr. Jin Ordering Provider for Wound Check Action taken: See Below.    This service provided today was under the supervising provider of the day Dr. Jin, who was available if needed.    Pt returned to clinic for post surgery 1 week follow up bandage change. Pt has no complaints, denies pain. Bandage removed from left brow, area cleansed with normal saline. Site is healing and wound edges approximating well. Reapplied new steri strips and paper tape.    Advised to watch for signs/sx of infection; spreading redness, drainage, odor, fever. Call or report promptly to clinic. Pt given written instructions and informed to rtc as needed. Patient verbalized understanding.     Rut Garay on 9/25/2024 at 2:39 PM

## 2024-10-10 ENCOUNTER — LAB (OUTPATIENT)
Dept: LAB | Facility: CLINIC | Age: 74
End: 2024-10-10
Payer: COMMERCIAL

## 2024-10-10 DIAGNOSIS — Z79.899 HIGH RISK MEDICATION USE: ICD-10-CM

## 2024-10-10 DIAGNOSIS — I73.9 PERIPHERAL VASCULAR DISEASE, UNSPECIFIED (H): ICD-10-CM

## 2024-10-10 LAB — INR BLD: 2.2 (ref 0.9–1.1)

## 2024-10-10 PROCEDURE — 85610 PROTHROMBIN TIME: CPT

## 2024-10-10 PROCEDURE — 36416 COLLJ CAPILLARY BLOOD SPEC: CPT

## 2024-11-06 ENCOUNTER — TRANSFERRED RECORDS (OUTPATIENT)
Dept: HEALTH INFORMATION MANAGEMENT | Facility: CLINIC | Age: 74
End: 2024-11-06
Payer: COMMERCIAL

## 2024-12-05 ENCOUNTER — LAB (OUTPATIENT)
Dept: LAB | Facility: CLINIC | Age: 74
End: 2024-12-05
Payer: COMMERCIAL

## 2024-12-05 DIAGNOSIS — Z79.899 HIGH RISK MEDICATION USE: ICD-10-CM

## 2024-12-05 DIAGNOSIS — I73.9 PERIPHERAL VASCULAR DISEASE, UNSPECIFIED (H): ICD-10-CM

## 2024-12-05 LAB — INR BLD: 2.6 (ref 0.9–1.1)

## 2025-01-16 ENCOUNTER — LAB (OUTPATIENT)
Dept: LAB | Facility: CLINIC | Age: 75
End: 2025-01-16
Payer: COMMERCIAL

## 2025-01-16 DIAGNOSIS — I73.9 PERIPHERAL VASCULAR DISEASE, UNSPECIFIED: ICD-10-CM

## 2025-01-16 DIAGNOSIS — Z79.899 HIGH RISK MEDICATION USE: ICD-10-CM

## 2025-01-16 LAB — INR BLD: 2.4 (ref 0.9–1.1)

## 2025-02-24 ENCOUNTER — OFFICE VISIT (OUTPATIENT)
Dept: OTOLARYNGOLOGY | Facility: CLINIC | Age: 75
End: 2025-02-24
Payer: COMMERCIAL

## 2025-02-24 DIAGNOSIS — J32.9 CHRONIC SINUSITIS, UNSPECIFIED LOCATION: Primary | ICD-10-CM

## 2025-02-24 DIAGNOSIS — Q74.2: ICD-10-CM

## 2025-02-24 DIAGNOSIS — J34.2 DEVIATED NASAL SEPTUM: ICD-10-CM

## 2025-02-24 DIAGNOSIS — R09.81 CONGESTION OF PARANASAL SINUS: ICD-10-CM

## 2025-02-24 DIAGNOSIS — J34.89 NASAL OBSTRUCTION: ICD-10-CM

## 2025-02-24 DIAGNOSIS — G47.33 OBSTRUCTIVE SLEEP APNEA SYNDROME: ICD-10-CM

## 2025-02-24 PROCEDURE — 99204 OFFICE O/P NEW MOD 45 MIN: CPT | Performed by: OTOLARYNGOLOGY

## 2025-02-24 RX ORDER — AZELASTINE 1 MG/ML
SPRAY, METERED NASAL
Qty: 30 ML | Refills: 11 | Status: SHIPPED | OUTPATIENT
Start: 2025-02-24

## 2025-02-24 NOTE — PROGRESS NOTES
"CHIEF COMPLAINT: Patient presents with:  Sinus Problem: Per PT he has had sinus surgery in the past, he is a mouth breather, and has congestion. This has been going on for about 50 years. PT was in the [employer] fought in [place] not sure if this has anything to do with sinus issues.            HISTORY OF PRESENT ILLNESS    Phil was seen at the behest of Laina [facility] for sinus concerns.   Patient has long history of nasal obstruction.  History of SARY.  Intolerant of CPAP.  Uses flonase at bedtime routinely.    Mouth breather at night.   Wakes himself up frequently during the night to use the bathroom and because his mouth is dry.  History of septal/turbinate surgery.  Symptoms worse in the spring/Fall.   Has used Afrin in the past \"which takes care of things so I can sleep\".       Sino-Nasal Outcome Test (SNOT - 22)    1. Need to Blow Nose: (Proxy-Rptd) (P) Severe  2. Nasal Blockage: (Proxy-Rptd) (P) Severe  3. Sneezing: (Proxy-Rptd) (P) Moderate  4. Runny Nose: (Proxy-Rptd) (P) Moderate  5. Cough: (Proxy-Rptd) (P) None  6. Post-nasal discharge: (Proxy-Rptd) (P) Mild or slight  7. Thick nasal discharge: (Proxy-Rptd) (P) Moderate  8. Ear fullness: (Proxy-Rptd) (P) None  9. Dizziness: (Proxy-Rptd) (P) None  10. Ear Pain: (Proxy-Rptd) (P) None  11. Facial pain/pressure: (Proxy-Rptd) (P) Mild or slight  12. Decreased Sense of Smell/Taste: (Proxy-Rptd) (P) None  13. Difficulty falling asleep: (Proxy-Rptd) (P) Severe  14. Wake up at night: (Proxy-Rptd) (P) Severe  15. Lack of a good night's sleep: (Proxy-Rptd) (P) Severe  16. Wake up tired: (Proxy-Rptd) (P) Mild or slight  17. Fatigue: (Proxy-Rptd) (P) Moderate  18. Reduced Productivity: (Proxy-Rptd) (P) Moderate  19. Reduced Concentration: (Proxy-Rptd) (P) Mild or slight  20. Frustrated/restless/irritable: (Proxy-Rptd) (P) Moderate  21. Sad: (Proxy-Rptd) (P) None  22. Embarrassed: (Proxy-Rptd) (P) None    Total Score: (Proxy-Rptd) (P) 46    COPYRIGHT 1996. " [facility] IN [place], [place]        REVIEW OF SYSTEMS    Review of Systems as per HPI and PMHx, otherwise 10 system review system are negative.       ALLERGIES    Nka [no known allergies]    CURRENT MEDICATIONS      Current Outpatient Medications:     azelastine (ASTELIN) 0.1 % nasal spray, 2 sprays each nostril 1-2x daily as needed for nasal congestion (use nightly for first 2 week), Disp: 30 mL, Rfl: 11    acetaminophen (TYLENOL) 500 MG tablet, Take 1,000 mg by mouth 3 times daily, Disp: , Rfl:     aspirin 81 MG chewable tablet, Take 81 mg by mouth daily, Disp: , Rfl:     BOSUTINIB PO, Take 400 mg by mouth daily, Disp: , Rfl:     cilostazol (PLETAL) 100 MG tablet, Take 100 mg by mouth 2 times daily, Disp: , Rfl:     clopidogrel (PLAVIX) 75 MG tablet, Take 75 mg by mouth daily, Disp: , Rfl:     ferrous sulfate (IRON) 325 (65 FE) MG tablet, Take 325 mg by mouth daily (with breakfast), Disp: , Rfl:     FLONASE INHA 50 MCG/DOSE NA, INHALE 2 SPRAYS IN EACH NOSTRIL ONCE DAILY, Disp: one , Rfl: 0    Gemfibrozil (LOPID PO), Take 600 mg by mouth 2 times daily (before meals), Disp: , Rfl:     HYDROcodone-acetaminophen (NORCO) 5-325 MG per tablet, 1-2 tabs po q 4-6 hrs. prn pain, Disp: 20 tablet, Rfl: 0    lidocaine (XYLOCAINE) 5 % ointment, Apply topically as needed To left knee, Disp: , Rfl:     METFORMIN HCL PO, Take 500 mg by mouth 2 times daily (with meals), Disp: , Rfl:     methylPREDNISolone (MEDROL DOSEPAK) 4 MG tablet, Follow package instructions, Disp: 21 tablet, Rfl: 0    Metoprolol Tartrate 37.5 MG TABS, Take 37.5 mg by mouth 2 times daily, Disp: , Rfl:     nitroGLYcerin (NITROSTAT) 0.4 MG sublingual tablet, Place 0.4 mg under the tongue every 5 minutes as needed, Disp: , Rfl:     Omega-3 Fatty Acids (OMEGA-3 FISH OIL PO), Take 2 g by mouth daily, Disp: , Rfl:     Prochlorperazine Maleate (COMPAZINE PO), Take 10 mg by mouth every 6 hours as needed for nausea, Disp: , Rfl:     psyllium 0.52 G  capsule, Take 1 capsule by mouth daily, Disp: , Rfl:     selenium sulfide (SELSUN) 2.5 % shampoo, Apply topically daily as needed for itching or irritation, Disp: , Rfl:     senna-docusate (SENOKOT-S;PERICOLACE) 8.6-50 MG per tablet, Take 1 tablet by mouth 2 times daily, Disp: , Rfl:     Testosterone Cypionate & Prop 200-20 MG/ML SOLN, 200 mg every 28 days, Disp: , Rfl:      PAST MEDICAL HISTORY    PAST MEDICAL HISTORY:   Past Medical History:   Diagnosis Date    CML (chronic myelocytic leukaemia)     Diabetes (H)     Duodenal ulcer, unspecified as acute or chronic, without hemorrhage, perforation, or obstruction     Other malaria        PAST SURGICAL HISTORY    PAST SURGICAL HISTORY:   Past Surgical History:   Procedure Laterality Date    IR ABDOMINAL AORTOGRAM  4/26/2012    IR ABDOMINAL AORTOGRAM  7/12/2012    IR ABDOMINAL AORTOGRAM  9/5/2018    IR MISCELLANEOUS PROCEDURE  4/26/2012    IR MISCELLANEOUS PROCEDURE  4/26/2012    IR MISCELLANEOUS PROCEDURE  5/24/2012    IR MISCELLANEOUS PROCEDURE  7/12/2012    IR MISCELLANEOUS PROCEDURE  7/12/2012    IR MISCELLANEOUS PROCEDURE  7/13/2012    IR MISCELLANEOUS PROCEDURE  7/13/2012    IR MISCELLANEOUS PROCEDURE  7/14/2012    IR MISCELLANEOUS PROCEDURE  7/15/2012    PHACOEMULSIFICATION WITH STANDARD INTRAOCULAR LENS IMPLANT Right 2/12/2015    Procedure: PHACOEMULSIFICATION WITH STANDARD INTRAOCULAR LENS IMPLANT;  Surgeon: Chucky Barrera MD;  Location: [facility]    PHACOEMULSIFICATION WITH STANDARD INTRAOCULAR LENS IMPLANT Left 3/18/2015    Procedure: PHACOEMULSIFICATION WITH STANDARD INTRAOCULAR LENS IMPLANT;  Surgeon: Chucky Barrera MD;  Location: [facility]    SURGICAL HISTORY OF -       septoplasty    SURGICAL HISTORY OF -   07/18/2000    colonoscopy       FAMILY  HISTORY    FAMILY HISTORY:   Family History   Problem Relation Age of Onset    Diabetes Mother     Cancer Father        SOCIAL HISTORY    SOCIAL HISTORY:   Social History     Tobacco Use    Smoking status:  Every Day     Current packs/day: 1.00     Average packs/day: 1 pack/day for 35.0 years (35.0 ttl pk-yrs)     Types: Cigarettes    Smokeless tobacco: Not on file   Substance Use Topics    Alcohol use: Yes     Comment: socially        PHYSICAL EXAM    HEAD: Normal appearance and symmetry:  No cutaneous lesions.      NECK:  supple     EARS:    Right:   TM intact   LEFT:   TM intact    EYES:  EOMI    CN VII/XII:  intact     NOSE:     Dorsum:   straight  Septum: Slight deviation left  Mucosa:  mois  Turban hypertrophy; mild  Nasal dip:  ptotic/bulbous        ORAL CAVITY/OROPHARYNX:     Lips:  Normal.  Tongue: normal, midline  Mucosa:   no lesions     NECK:  Trachea:  midline.              Thyroid:  normal              Adenopathy:  none        NEURO:   Alert and Oriented     GAIT AND STATION:  normal     RESPIRATORY:   Symmetry and Respiratory effort     PSYCH:  Normal mood and affect     SKIN:   warm and dry         IMPRESSION:    Encounter Diagnoses   Name Primary?    Chronic sinusitis, unspecified location Yes    Deviated nasal septum     Bulbous tip of toe     Nasal obstruction     Obstructive sleep apnea syndrome     Congestion of paranasal sinus           RECOMMENDATIONS:      Orders Placed This Encounter   Medications    azelastine (ASTELIN) 0.1 % nasal spray     Si sprays each nostril 1-2x daily as needed for nasal congestion (use nightly for first 2 week)     Dispense:  30 mL     Refill:  11      Patient is not [employer] interested in further nasal surgery.  He does get some relief using the Flonase spray at bedtime.  I discussed the risks of long-term steroids and would prefer he tries a antihistamine spray.  Return visit 4 months with CT sinus same day.   Recommend allergy testing (referral placed)

## 2025-02-24 NOTE — NURSING NOTE
Sino-Nasal Outcome Test (SNOT - 22)    1. Need to Blow Nose: (Proxy-Rptd) (P) Severe  2. Nasal Blockage: (Proxy-Rptd) (P) Severe  3. Sneezing: (Proxy-Rptd) (P) Moderate  4. Runny Nose: (Proxy-Rptd) (P) Moderate  5. Cough: (Proxy-Rptd) (P) None  6. Post-nasal discharge: (Proxy-Rptd) (P) Mild or slight  7. Thick nasal discharge: (Proxy-Rptd) (P) Moderate  8. Ear fullness: (Proxy-Rptd) (P) None  9. Dizziness: (Proxy-Rptd) (P) None  10. Ear Pain: (Proxy-Rptd) (P) None  11. Facial pain/pressure: (Proxy-Rptd) (P) Mild or slight  12. Decreased Sense of Smell/Taste: (Proxy-Rptd) (P) None  13. Difficulty falling asleep: (Proxy-Rptd) (P) Severe  14. Wake up at night: (Proxy-Rptd) (P) Severe  15. Lack of a good night's sleep: (Proxy-Rptd) (P) Severe  16. Wake up tired: (Proxy-Rptd) (P) Mild or slight  17. Fatigue: (Proxy-Rptd) (P) Moderate  18. Reduced Productivity: (Proxy-Rptd) (P) Moderate  19. Reduced Concentration: (Proxy-Rptd) (P) Mild or slight  20. Frustrated/restless/irritable: (Proxy-Rptd) (P) Moderate  21. Sad: (Proxy-Rptd) (P) None  22. Embarrassed: (Proxy-Rptd) (P) None    Total Score: (Proxy-Rptd) (P) 46    COPYRIGHT 1996. [facility]

## 2025-02-24 NOTE — LETTER
"2/24/2025      Phil Fam  [address]  [place] [address]      Dear Colleague,    Thank you for referring your patient, Phil Fam, to the [facility]. Please see a copy of my visit note below.    CHIEF COMPLAINT: Patient presents with:  Sinus Problem: Per PT he has had sinus surgery in the past, he is a mouth breather, and has congestion. This has been going on for about 50 years. PT was in the [employer] fought [place] not sure if this has anything to do with sinus issues.            HISTORY OF PRESENT ILLNESS    [facility]   Patient has long history of nasal obstruction.  History of SARY.  Intolerant of CPAP.  Uses flonase at bedtime routinely.    Mouth breather at night.   Wakes himself up frequently during the night to use the bathroom and because his mouth is dry.  History of septal/turbinate surgery.  Symptoms worse in the spring/Fall.   Has used Afrin in the past \"which takes care of things so I can sleep\".       Sino-Nasal Outcome Test (SNOT - 22)    1. Need to Blow Nose: (Proxy-Rptd) (P) Severe  2. Nasal Blockage: (Proxy-Rptd) (P) Severe  3. Sneezing: (Proxy-Rptd) (P) Moderate  4. Runny Nose: (Proxy-Rptd) (P) Moderate  5. Cough: (Proxy-Rptd) (P) None  6. Post-nasal discharge: (Proxy-Rptd) (P) Mild or slight  7. Thick nasal discharge: (Proxy-Rptd) (P) Moderate  8. Ear fullness: (Proxy-Rptd) (P) None  9. Dizziness: (Proxy-Rptd) (P) None  10. Ear Pain: (Proxy-Rptd) (P) None  11. Facial pain/pressure: (Proxy-Rptd) (P) Mild or slight  12. Decreased Sense of Smell/Taste: (Proxy-Rptd) (P) None  13. Difficulty falling asleep: (Proxy-Rptd) (P) Severe  14. Wake up at night: (Proxy-Rptd) (P) Severe  15. Lack of a good night's sleep: (Proxy-Rptd) (P) Severe  16. Wake up tired: (Proxy-Rptd) (P) Mild or slight  17. Fatigue: (Proxy-Rptd) (P) Moderate  18. Reduced Productivity: (Proxy-Rptd) (P) Moderate  19. Reduced " Concentration: (Proxy-Rptd) (P) Mild or slight  20. Frustrated/restless/irritable: (Proxy-Rptd) (P) Moderate  21. Sad: (Proxy-Rptd) (P) None  22. Embarrassed: (Proxy-Rptd) (P) None    Total Score: (Proxy-Rptd) (P) 46    COPYRIGHT 1996. [facility] [place], [place]        REVIEW OF SYSTEMS    Review of Systems as per HPI and PMHx, otherwise 10 system review system are negative.       ALLERGIES    Nka [no known allergies]    CURRENT MEDICATIONS      Current Outpatient Medications:      azelastine (ASTELIN) 0.1 % nasal spray, 2 sprays each nostril 1-2x daily as needed for nasal congestion (use nightly for first 2 week), Disp: 30 mL, Rfl: 11     acetaminophen (TYLENOL) 500 MG tablet, Take 1,000 mg by mouth 3 times daily, Disp: , Rfl:      aspirin 81 MG chewable tablet, Take 81 mg by mouth daily, Disp: , Rfl:      BOSUTINIB PO, Take 400 mg by mouth daily, Disp: , Rfl:      cilostazol (PLETAL) 100 MG tablet, Take 100 mg by mouth 2 times daily, Disp: , Rfl:      clopidogrel (PLAVIX) 75 MG tablet, Take 75 mg by mouth daily, Disp: , Rfl:      ferrous sulfate (IRON) 325 (65 FE) MG tablet, Take 325 mg by mouth daily (with breakfast), Disp: , Rfl:      FLONASE INHA 50 MCG/DOSE NA, INHALE 2 SPRAYS IN EACH NOSTRIL ONCE DAILY, Disp: one , Rfl: 0     Gemfibrozil (LOPID PO), Take 600 mg by mouth 2 times daily (before meals), Disp: , Rfl:      HYDROcodone-acetaminophen (NORCO) 5-325 MG per tablet, 1-2 tabs po q 4-6 hrs. prn pain, Disp: 20 tablet, Rfl: 0     lidocaine (XYLOCAINE) 5 % ointment, Apply topically as needed To left knee, Disp: , Rfl:      METFORMIN HCL PO, Take 500 mg by mouth 2 times daily (with meals), Disp: , Rfl:      methylPREDNISolone (MEDROL DOSEPAK) 4 MG tablet, Follow package instructions, Disp: 21 tablet, Rfl: 0     Metoprolol Tartrate 37.5 MG TABS, Take 37.5 mg by mouth 2 times daily, Disp: , Rfl:      nitroGLYcerin (NITROSTAT) 0.4 MG sublingual tablet, Place 0.4 mg under the tongue every 5  minutes as needed, Disp: , Rfl:      Omega-3 Fatty Acids (OMEGA-3 FISH OIL PO), Take 2 g by mouth daily, Disp: , Rfl:      Prochlorperazine Maleate (COMPAZINE PO), Take 10 mg by mouth every 6 hours as needed for nausea, Disp: , Rfl:      psyllium 0.52 G capsule, Take 1 capsule by mouth daily, Disp: , Rfl:      selenium sulfide (SELSUN) 2.5 % shampoo, Apply topically daily as needed for itching or irritation, Disp: , Rfl:      senna-docusate (SENOKOT-S;PERICOLACE) 8.6-50 MG per tablet, Take 1 tablet by mouth 2 times daily, Disp: , Rfl:      Testosterone Cypionate & Prop 200-20 MG/ML SOLN, 200 mg every 28 days, Disp: , Rfl:      PAST MEDICAL HISTORY    PAST MEDICAL HISTORY:   Past Medical History:   Diagnosis Date     CML (chronic myelocytic leukaemia)      Diabetes (H)      Duodenal ulcer, unspecified as acute or chronic, without hemorrhage, perforation, or obstruction      Other malaria        PAST SURGICAL HISTORY    PAST SURGICAL HISTORY:   Past Surgical History:   Procedure Laterality Date     IR ABDOMINAL AORTOGRAM  4/26/2012     IR ABDOMINAL AORTOGRAM  7/12/2012     IR ABDOMINAL AORTOGRAM  9/5/2018     IR MISCELLANEOUS PROCEDURE  4/26/2012     IR MISCELLANEOUS PROCEDURE  4/26/2012     IR MISCELLANEOUS PROCEDURE  5/24/2012     IR MISCELLANEOUS PROCEDURE  7/12/2012     IR MISCELLANEOUS PROCEDURE  7/12/2012     IR MISCELLANEOUS PROCEDURE  7/13/2012     IR MISCELLANEOUS PROCEDURE  7/13/2012     IR MISCELLANEOUS PROCEDURE  7/14/2012     IR MISCELLANEOUS PROCEDURE  7/15/2012     PHACOEMULSIFICATION WITH STANDARD INTRAOCULAR LENS IMPLANT Right 2/12/2015    Procedure: PHACOEMULSIFICATION WITH STANDARD INTRAOCULAR LENS IMPLANT;  Surgeon: Chucky Barrera MD;  Location: [facility]     PHACOEMULSIFICATION WITH STANDARD INTRAOCULAR LENS IMPLANT Left 3/18/2015    Procedure: PHACOEMULSIFICATION WITH STANDARD INTRAOCULAR LENS IMPLANT;  Surgeon: Chucky Barrera MD;  Location: [facility]     SURGICAL HISTORY OF -       septoplasty      SURGICAL HISTORY OF -   2000    colonoscopy       FAMILY  HISTORY    FAMILY HISTORY:   Family History   Problem Relation Age of Onset     Diabetes Mother      Cancer Father        SOCIAL HISTORY    SOCIAL HISTORY:   Social History     Tobacco Use     Smoking status: Every Day     Current packs/day: 1.00     Average packs/day: 1 pack/day for 35.0 years (35.0 ttl pk-yrs)     Types: Cigarettes     Smokeless tobacco: Not on file   Substance Use Topics     Alcohol use: Yes     Comment: socially        PHYSICAL EXAM    HEAD: Normal appearance and symmetry:  No cutaneous lesions.      NECK:  supple     EARS:    Right:   TM intact   LEFT:   TM intact    EYES:  EOMI    CN VII/XII:  intact     NOSE:     Dorsum:   straight  Septum: Slight deviation left  Mucosa:  mois  Turban hypertrophy; mild  Nasal dip:  ptotic/bulbous        ORAL CAVITY/OROPHARYNX:     Lips:  Normal.  Tongue: normal, midline  Mucosa:   no lesions     NECK:  Trachea:  midline.              Thyroid:  normal              Adenopathy:  none        NEURO:   Alert and Oriented     GAIT AND STATION:  normal     RESPIRATORY:   Symmetry and Respiratory effort     PSYCH:  Normal mood and affect     SKIN:   warm and dry         IMPRESSION:    Encounter Diagnoses   Name Primary?     Chronic sinusitis, unspecified location Yes     Deviated nasal septum      Bulbous tip of toe      Nasal obstruction      Obstructive sleep apnea syndrome      Congestion of paranasal sinus           RECOMMENDATIONS:      Orders Placed This Encounter   Medications     azelastine (ASTELIN) 0.1 % nasal spray     Si sprays each nostril 1-2x daily as needed for nasal congestion (use nightly for first 2 week)     Dispense:  30 mL     Refill:  11      Patient is not [employer] interested in further nasal surgery.  He does get some relief using the Flonase spray at bedtime.  I discussed the risks of long-term steroids and would prefer he tries a antihistamine spray.  Return visit 4 months  with CT sinus same day.   Recommend allergy testing (referral placed)      Again, thank you for allowing me to participate in the care of your patient.        Sincerely,        Seferino Long MD    Electronically signed

## 2025-03-13 ENCOUNTER — LAB (OUTPATIENT)
Dept: LAB | Facility: CLINIC | Age: 75
End: 2025-03-13
Payer: COMMERCIAL

## 2025-03-13 DIAGNOSIS — Z79.899 HIGH RISK MEDICATION USE: ICD-10-CM

## 2025-03-13 DIAGNOSIS — I73.9 PERIPHERAL VASCULAR DISEASE, UNSPECIFIED: ICD-10-CM

## 2025-03-13 LAB — INR BLD: 2.4 (ref 0.9–1.1)

## 2025-05-08 ENCOUNTER — LAB (OUTPATIENT)
Dept: LAB | Facility: CLINIC | Age: 75
End: 2025-05-08
Payer: COMMERCIAL

## 2025-05-08 DIAGNOSIS — Z79.899 HIGH RISK MEDICATION USE: ICD-10-CM

## 2025-05-08 DIAGNOSIS — I73.9 PERIPHERAL VASCULAR DISEASE, UNSPECIFIED: ICD-10-CM

## 2025-05-08 LAB — INR BLD: 3.9 (ref 0.9–1.1)

## 2025-05-29 ENCOUNTER — LAB (OUTPATIENT)
Dept: LAB | Facility: CLINIC | Age: 75
End: 2025-05-29
Payer: COMMERCIAL

## 2025-05-29 DIAGNOSIS — I73.9 PERIPHERAL VASCULAR DISEASE, UNSPECIFIED: ICD-10-CM

## 2025-05-29 DIAGNOSIS — Z79.899 HIGH RISK MEDICATION USE: ICD-10-CM

## 2025-05-29 LAB — INR BLD: 3.4 (ref 0.9–1.1)

## 2025-07-04 NOTE — PROGRESS NOTES
ENT FOLLOW UP VISIT NOTE    Patient presents with:  Follow Up: 4 month follow up sinus issues. PT reports that symptoms are the same and nasal spray did not help. SNOT 50       HISTORY OF PRESENT ILLNESS    Phil was seen in follow up for SINUS CT review. He had no improvement with the astelin spray.  If fact. He think it made it worse.  He continue to have nasal obstruction, worse at night.  This affects his sleep and he states he has sleep apnea if he does not use a nasal congestant like afrin.  [facility].     History of nasal septoplasty (x 2).  Most recent was approximately 10 years ago.  He has has this problem since his service in [employer].  He wears a breathe right strip every night and uses nasal saline. No AM headaches.   He denies daytime sleepiness.     Sino-Nasal Outcome Test (SNOT - 22)    1. Need to Blow Nose: (Proxy-Rptd) (P) Severe  2. Nasal Blockage: (Proxy-Rptd) (P) Severe  3. Sneezing: (Proxy-Rptd) (P) Moderate  4. Runny Nose: (Proxy-Rptd) (P) Moderate  5. Cough: (Proxy-Rptd) (P) None  6. Post-nasal discharge: (Proxy-Rptd) (P) Severe  7. Thick nasal discharge: (Proxy-Rptd) (P) Moderate  8. Ear fullness: (Proxy-Rptd) (P) None  9. Dizziness: (Proxy-Rptd) (P) None  10. Ear Pain: (Proxy-Rptd) (P) None  11. Facial pain/pressure: (Proxy-Rptd) (P) None  12. Decreased Sense of Smell/Taste: (Proxy-Rptd) (P) Mild or slight  13. Difficulty falling asleep: (Proxy-Rptd) (P) Severe  14. Wake up at night: (Proxy-Rptd) (P) Severe  15. Lack of a good night's sleep: (Proxy-Rptd) (P) Severe  16. Wake up tired: (Proxy-Rptd) (P) Severe  17. Fatigue: (Proxy-Rptd) (P) Moderate  18. Reduced Productivity: (Proxy-Rptd) (P) None  19. Reduced Concentration: (Proxy-Rptd) (P) Mild or slight  20. Frustrated/restless/irritable: (Proxy-Rptd) (P) Mild or slight  21. Sad: (Proxy-Rptd) (P) Mild or slight  22. Embarrassed: (Proxy-Rptd) (P) Mild or slight    Total Score:  (Proxy-Rptd) (P) 41    COPYRIGHT 1996. [facility] [place] [facility]       ALLERGIES    Nka [no known allergies]    CURRENT MEDICATIONS      Current Outpatient Medications:     acetaminophen (TYLENOL) 500 MG tablet, Take 1,000 mg by mouth 3 times daily, Disp: , Rfl:     aspirin 81 MG chewable tablet, Take 81 mg by mouth daily, Disp: , Rfl:     azelastine (ASTELIN) 0.1 % nasal spray, 2 sprays each nostril 1-2x daily as needed for nasal congestion (use nightly for first 2 week), Disp: 30 mL, Rfl: 11    BOSUTINIB PO, Take 400 mg by mouth daily, Disp: , Rfl:     cilostazol (PLETAL) 100 MG tablet, Take 100 mg by mouth 2 times daily, Disp: , Rfl:     clopidogrel (PLAVIX) 75 MG tablet, Take 75 mg by mouth daily, Disp: , Rfl:     ferrous sulfate (IRON) 325 (65 FE) MG tablet, Take 325 mg by mouth daily (with breakfast), Disp: , Rfl:     FLONASE INHA 50 MCG/DOSE NA, INHALE 2 SPRAYS IN EACH NOSTRIL ONCE DAILY, Disp: one , Rfl: 0    Gemfibrozil (LOPID PO), Take 600 mg by mouth 2 times daily (before meals), Disp: , Rfl:     HYDROcodone-acetaminophen (NORCO) 5-325 MG per tablet, 1-2 tabs po q 4-6 hrs. prn pain, Disp: 20 tablet, Rfl: 0    lidocaine (XYLOCAINE) 5 % ointment, Apply topically as needed To left knee, Disp: , Rfl:     METFORMIN HCL PO, Take 500 mg by mouth 2 times daily (with meals), Disp: , Rfl:     methylPREDNISolone (MEDROL DOSEPAK) 4 MG tablet, Follow package instructions, Disp: 21 tablet, Rfl: 0    Metoprolol Tartrate 37.5 MG TABS, Take 37.5 mg by mouth 2 times daily, Disp: , Rfl:     nitroGLYcerin (NITROSTAT) 0.4 MG sublingual tablet, Place 0.4 mg under the tongue every 5 minutes as needed, Disp: , Rfl:     Omega-3 Fatty Acids (OMEGA-3 FISH OIL PO), Take 2 g by mouth daily, Disp: , Rfl:     Prochlorperazine Maleate (COMPAZINE PO), Take 10 mg by mouth every 6 hours as needed for nausea, Disp: , Rfl:     psyllium 0.52 G capsule, Take 1 capsule by mouth daily, Disp: , Rfl:     selenium sulfide (SELSUN)  2.5 % shampoo, Apply topically daily as needed for itching or irritation, Disp: , Rfl:     senna-docusate (SENOKOT-S;PERICOLACE) 8.6-50 MG per tablet, Take 1 tablet by mouth 2 times daily, Disp: , Rfl:     Testosterone Cypionate & Prop 200-20 MG/ML SOLN, 200 mg every 28 days, Disp: , Rfl:      PAST MEDICAL HISTORY    PAST MEDICAL HISTORY:   Past Medical History:   Diagnosis Date    CML (chronic myelocytic leukaemia)     Diabetes (H)     Duodenal ulcer, unspecified as acute or chronic, without hemorrhage, perforation, or obstruction     Other malaria        PAST SURGICAL HISTORY    PAST SURGICAL HISTORY:   Past Surgical History:   Procedure Laterality Date    IR ABDOMINAL AORTOGRAM  4/26/2012    IR ABDOMINAL AORTOGRAM  7/12/2012    IR ABDOMINAL AORTOGRAM  9/5/2018    IR MISCELLANEOUS PROCEDURE  4/26/2012    IR MISCELLANEOUS PROCEDURE  4/26/2012    IR MISCELLANEOUS PROCEDURE  5/24/2012    IR MISCELLANEOUS PROCEDURE  7/12/2012    IR MISCELLANEOUS PROCEDURE  7/12/2012    IR MISCELLANEOUS PROCEDURE  7/13/2012    IR MISCELLANEOUS PROCEDURE  7/13/2012    IR MISCELLANEOUS PROCEDURE  7/14/2012    IR MISCELLANEOUS PROCEDURE  7/15/2012    PHACOEMULSIFICATION WITH STANDARD INTRAOCULAR LENS IMPLANT Right 2/12/2015    Procedure: PHACOEMULSIFICATION WITH STANDARD INTRAOCULAR LENS IMPLANT;  Surgeon: Chucky Barrera MD;  Location: [facility]    PHACOEMULSIFICATION WITH STANDARD INTRAOCULAR LENS IMPLANT Left 3/18/2015    Procedure: PHACOEMULSIFICATION WITH STANDARD INTRAOCULAR LENS IMPLANT;  Surgeon: Chucky Barrera MD;  Location: [facility]    SURGICAL HISTORY OF -       septoplasty    SURGICAL HISTORY OF -   07/18/2000    colonoscopy       FAMILY  HISTORY    FAMILY HISTORY:   Family History   Problem Relation Age of Onset    Diabetes Mother     Cancer Father        SOCIAL HISTORY    SOCIAL HISTORY:   Social History     Tobacco Use    Smoking status: Every Day     Current packs/day: 1.00     Average packs/day: 1 pack/day for 35.0  years (35.0 ttl pk-yrs)     Types: Cigarettes    Smokeless tobacco: Not on file   Substance Use Topics    Alcohol use: Yes     Comment: socially        PHYSICAL EXAM    HEAD: Normal appearance and symmetry:  No cutaneous lesions.      NECK:  supple     EARS:  Auricles normal without lesions    EYES:  EOMI    CN VII/XII:  intact     NOSE:  patent\   Tip: bulbous and ptotic   Septum: slight anterior bowing to LEFT   ITH:  2-3+         ORAL CAVITY/OROPHARYNX:     Lips:  Normal.  Tongue: normal, midline  Mucosa:   no lesions     NECK:  Trachea:  midline.        NEURO:   Alert and Oriented        RESPIRATORY:   Symmetry and Respiratory effort     PSYCH:  Normal mood and affect     SKIN:   warm and dry         IMPRESSION:    Encounter Diagnoses   Name Primary?    Refractory obstruction of nasal airway Yes    Overhanging nasal tip     History of nasal surgery     Deviated nasal septum           RECOMMENDATIONS:    Referral to Geno Mack for revision septorhinoplasty.

## 2025-07-07 ENCOUNTER — OFFICE VISIT (OUTPATIENT)
Dept: OTOLARYNGOLOGY | Facility: CLINIC | Age: 75
End: 2025-07-07
Payer: COMMERCIAL

## 2025-07-07 ENCOUNTER — HOSPITAL ENCOUNTER (OUTPATIENT)
Dept: CT IMAGING | Facility: HOSPITAL | Age: 75
Discharge: HOME | End: 2025-07-07
Attending: OTOLARYNGOLOGY | Admitting: OTOLARYNGOLOGY
Payer: COMMERCIAL

## 2025-07-07 DIAGNOSIS — Z98.890 HISTORY OF NASAL SURGERY: ICD-10-CM

## 2025-07-07 DIAGNOSIS — J34.2 DEVIATED NASAL SEPTUM: ICD-10-CM

## 2025-07-07 DIAGNOSIS — J34.89 REFRACTORY OBSTRUCTION OF NASAL AIRWAY: Primary | ICD-10-CM

## 2025-07-07 DIAGNOSIS — M95.0: ICD-10-CM

## 2025-07-07 DIAGNOSIS — R09.81 CONGESTION OF PARANASAL SINUS: ICD-10-CM

## 2025-07-07 PROCEDURE — 99214 OFFICE O/P EST MOD 30 MIN: CPT | Performed by: OTOLARYNGOLOGY

## 2025-07-07 PROCEDURE — 70486 CT MAXILLOFACIAL W/O DYE: CPT

## 2025-07-07 NOTE — NURSING NOTE
Sino-Nasal Outcome Test (SNOT - 22)    1. Need to Blow Nose: (Proxy-Rptd) (P) Severe  2. Nasal Blockage: (Proxy-Rptd) (P) Severe  3. Sneezing: (Proxy-Rptd) (P) Moderate  4. Runny Nose: (Proxy-Rptd) (P) Moderate  5. Cough: (Proxy-Rptd) (P) None  6. Post-nasal discharge: (Proxy-Rptd) (P) Severe  7. Thick nasal discharge: (Proxy-Rptd) (P) Moderate  8. Ear fullness: (Proxy-Rptd) (P) None  9. Dizziness: (Proxy-Rptd) (P) None  10. Ear Pain: (Proxy-Rptd) (P) None  11. Facial pain/pressure: (Proxy-Rptd) (P) None  12. Decreased Sense of Smell/Taste: (Proxy-Rptd) (P) Mild or slight  13. Difficulty falling asleep: (Proxy-Rptd) (P) Severe  14. Wake up at night: (Proxy-Rptd) (P) Severe  15. Lack of a good night's sleep: (Proxy-Rptd) (P) Severe  16. Wake up tired: (Proxy-Rptd) (P) Severe  17. Fatigue: (Proxy-Rptd) (P) Moderate  18. Reduced Productivity: (Proxy-Rptd) (P) None  19. Reduced Concentration: (Proxy-Rptd) (P) Mild or slight  20. Frustrated/restless/irritable: (Proxy-Rptd) (P) Mild or slight  21. Sad: (Proxy-Rptd) (P) Mild or slight  22. Embarrassed: (Proxy-Rptd) (P) Mild or slight    Total Score: (Proxy-Rptd) (P) 50    COPYRIGHT 1996. [facility]

## 2025-07-07 NOTE — LETTER
7/7/2025      Phil Fam  [address]  [place] [address]      Dear Colleague,    Thank you for referring your patient, Phil Fam, to the [facility]. Please see a copy of my visit note below.        ENT FOLLOW UP VISIT NOTE    Patient presents with:  Follow Up: 4 month follow up sinus issues. PT reports that symptoms are the same and nasal spray did not help. SNOT 50       HISTORY OF PRESENT ILLNESS    Phil was seen in follow up for SINUS CT review. He had no improvement with the astelin spray.  If fact. He think it made it worse.  He continue to have nasal obstruction, worse at night.  This affects his sleep and he states he has sleep apnea if he does not use a nasal congestant like afrin.  [facility].     History of nasal septoplasty (x 2).  Most recent was approximately 10 years ago.  He has has this problem since his service in [place].  He wears a breathe right strip every night and uses nasal saline. No AM headaches.   He denies daytime sleepiness.     Sino-Nasal Outcome Test (SNOT - 22)    1. Need to Blow Nose: (Proxy-Rptd) (P) Severe  2. Nasal Blockage: (Proxy-Rptd) (P) Severe  3. Sneezing: (Proxy-Rptd) (P) Moderate  4. Runny Nose: (Proxy-Rptd) (P) Moderate  5. Cough: (Proxy-Rptd) (P) None  6. Post-nasal discharge: (Proxy-Rptd) (P) Severe  7. Thick nasal discharge: (Proxy-Rptd) (P) Moderate  8. Ear fullness: (Proxy-Rptd) (P) None  9. Dizziness: (Proxy-Rptd) (P) None  10. Ear Pain: (Proxy-Rptd) (P) None  11. Facial pain/pressure: (Proxy-Rptd) (P) None  12. Decreased Sense of Smell/Taste: (Proxy-Rptd) (P) Mild or slight  13. Difficulty falling asleep: (Proxy-Rptd) (P) Severe  14. Wake up at night: (Proxy-Rptd) (P) Severe  15. Lack of a good night's sleep: (Proxy-Rptd) (P) Severe  16. Wake up tired: (Proxy-Rptd) (P) Severe  17. Fatigue: (Proxy-Rptd) (P) Moderate  18. Reduced Productivity: (Proxy-Rptd) (P)  None  19. Reduced Concentration: (Proxy-Rptd) (P) Mild or slight  20. Frustrated/restless/irritable: (Proxy-Rptd) (P) Mild or slight  21. Sad: (Proxy-Rptd) (P) Mild or slight  22. Embarrassed: (Proxy-Rptd) (P) Mild or slight    Total Score: (Proxy-Rptd) (P) 50    COPYRIGHT 1996. [facility] [place], [place]       ALLERGIES    Nka [no known allergies]    CURRENT MEDICATIONS      Current Outpatient Medications:      acetaminophen (TYLENOL) 500 MG tablet, Take 1,000 mg by mouth 3 times daily, Disp: , Rfl:      aspirin 81 MG chewable tablet, Take 81 mg by mouth daily, Disp: , Rfl:      azelastine (ASTELIN) 0.1 % nasal spray, 2 sprays each nostril 1-2x daily as needed for nasal congestion (use nightly for first 2 week), Disp: 30 mL, Rfl: 11     BOSUTINIB PO, Take 400 mg by mouth daily, Disp: , Rfl:      cilostazol (PLETAL) 100 MG tablet, Take 100 mg by mouth 2 times daily, Disp: , Rfl:      clopidogrel (PLAVIX) 75 MG tablet, Take 75 mg by mouth daily, Disp: , Rfl:      ferrous sulfate (IRON) 325 (65 FE) MG tablet, Take 325 mg by mouth daily (with breakfast), Disp: , Rfl:      FLONASE INHA 50 MCG/DOSE NA, INHALE 2 SPRAYS IN EACH NOSTRIL ONCE DAILY, Disp: one , Rfl: 0     Gemfibrozil (LOPID PO), Take 600 mg by mouth 2 times daily (before meals), Disp: , Rfl:      HYDROcodone-acetaminophen (NORCO) 5-325 MG per tablet, 1-2 tabs po q 4-6 hrs. prn pain, Disp: 20 tablet, Rfl: 0     lidocaine (XYLOCAINE) 5 % ointment, Apply topically as needed To left knee, Disp: , Rfl:      METFORMIN HCL PO, Take 500 mg by mouth 2 times daily (with meals), Disp: , Rfl:      methylPREDNISolone (MEDROL DOSEPAK) 4 MG tablet, Follow package instructions, Disp: 21 tablet, Rfl: 0     Metoprolol Tartrate 37.5 MG TABS, Take 37.5 mg by mouth 2 times daily, Disp: , Rfl:      nitroGLYcerin (NITROSTAT) 0.4 MG sublingual tablet, Place 0.4 mg under the tongue every 5 minutes as needed, Disp: , Rfl:      Omega-3 Fatty Acids (OMEGA-3 FISH OIL  PO), Take 2 g by mouth daily, Disp: , Rfl:      Prochlorperazine Maleate (COMPAZINE PO), Take 10 mg by mouth every 6 hours as needed for nausea, Disp: , Rfl:      psyllium 0.52 G capsule, Take 1 capsule by mouth daily, Disp: , Rfl:      selenium sulfide (SELSUN) 2.5 % shampoo, Apply topically daily as needed for itching or irritation, Disp: , Rfl:      senna-docusate (SENOKOT-S;PERICOLACE) 8.6-50 MG per tablet, Take 1 tablet by mouth 2 times daily, Disp: , Rfl:      Testosterone Cypionate & Prop 200-20 MG/ML SOLN, 200 mg every 28 days, Disp: , Rfl:      PAST MEDICAL HISTORY    PAST MEDICAL HISTORY:   Past Medical History:   Diagnosis Date     CML (chronic myelocytic leukaemia)      Diabetes (H)      Duodenal ulcer, unspecified as acute or chronic, without hemorrhage, perforation, or obstruction      Other malaria        PAST SURGICAL HISTORY    PAST SURGICAL HISTORY:   Past Surgical History:   Procedure Laterality Date     IR ABDOMINAL AORTOGRAM  4/26/2012     IR ABDOMINAL AORTOGRAM  7/12/2012     IR ABDOMINAL AORTOGRAM  9/5/2018     IR MISCELLANEOUS PROCEDURE  4/26/2012     IR MISCELLANEOUS PROCEDURE  4/26/2012     IR MISCELLANEOUS PROCEDURE  5/24/2012     IR MISCELLANEOUS PROCEDURE  7/12/2012     IR MISCELLANEOUS PROCEDURE  7/12/2012     IR MISCELLANEOUS PROCEDURE  7/13/2012     IR MISCELLANEOUS PROCEDURE  7/13/2012     IR MISCELLANEOUS PROCEDURE  7/14/2012     IR MISCELLANEOUS PROCEDURE  7/15/2012     PHACOEMULSIFICATION WITH STANDARD INTRAOCULAR LENS IMPLANT Right 2/12/2015    Procedure: PHACOEMULSIFICATION WITH STANDARD INTRAOCULAR LENS IMPLANT;  Surgeon: Chucky Barrera MD;  Location: [facility]     PHACOEMULSIFICATION WITH STANDARD INTRAOCULAR LENS IMPLANT Left 3/18/2015    Procedure: PHACOEMULSIFICATION WITH STANDARD INTRAOCULAR LENS IMPLANT;  Surgeon: Chucky Barrera MD;  Location: [facility] OR     SURGICAL HISTORY OF -       septoplasty     SURGICAL HISTORY OF -   07/18/2000    colonoscopy       FAMILY   HISTORY    FAMILY HISTORY:   Family History   Problem Relation Age of Onset     Diabetes Mother      Cancer Father        SOCIAL HISTORY    SOCIAL HISTORY:   Social History     Tobacco Use     Smoking status: Every Day     Current packs/day: 1.00     Average packs/day: 1 pack/day for 35.0 years (35.0 ttl pk-yrs)     Types: Cigarettes     Smokeless tobacco: Not on file   Substance Use Topics     Alcohol use: Yes     Comment: socially        PHYSICAL EXAM    HEAD: Normal appearance and symmetry:  No cutaneous lesions.      NECK:  supple     EARS:  Auricles normal without lesions    EYES:  EOMI    CN VII/XII:  intact     NOSE:  patent\   Tip: bulbous and ptotic   Septum: slight anterior bowing to LEFT   ITH:  2-3+         ORAL CAVITY/OROPHARYNX:     Lips:  Normal.  Tongue: normal, midline  Mucosa:   no lesions     NECK:  Trachea:  midline.        NEURO:   Alert and Oriented        RESPIRATORY:   Symmetry and Respiratory effort     PSYCH:  Normal mood and affect     SKIN:   warm and dry         IMPRESSION:    Encounter Diagnoses   Name Primary?     Refractory obstruction of nasal airway Yes     Overhanging nasal tip      History of nasal surgery      Deviated nasal septum           RECOMMENDATIONS:    Referral to Geno Mack for revision septorhinoplasty.           Again, thank you for allowing me to participate in the care of your patient.        Sincerely,        Seferino Long MD    Electronically signed

## 2025-07-08 ENCOUNTER — PATIENT OUTREACH (OUTPATIENT)
Dept: CARE COORDINATION | Facility: CLINIC | Age: 75
End: 2025-07-08
Payer: COMMERCIAL

## 2025-08-06 ENCOUNTER — LAB (OUTPATIENT)
Dept: LAB | Facility: CLINIC | Age: 75
End: 2025-08-06
Payer: COMMERCIAL

## 2025-08-06 DIAGNOSIS — I73.9 PERIPHERAL VASCULAR DISEASE, UNSPECIFIED: ICD-10-CM

## 2025-08-06 DIAGNOSIS — Z79.01 LONG TERM (CURRENT) USE OF ANTICOAGULANTS: ICD-10-CM

## 2025-08-06 LAB
HCT VFR BLD AUTO: 44.1 % (ref 40–53)
HGB BLD-MCNC: 14.9 G/DL (ref 13.3–17.7)
INR BLD: 3.2 (ref 0.9–1.1)
MCV RBC AUTO: 98 FL (ref 78–100)
MCV RBC AUTO: 98 FL (ref 78–100)
PLATELET # BLD AUTO: 183 10E3/UL (ref 150–450)

## 2025-08-06 PROCEDURE — 85014 HEMATOCRIT: CPT

## 2025-08-06 PROCEDURE — 85049 AUTOMATED PLATELET COUNT: CPT

## 2025-08-06 PROCEDURE — 85018 HEMOGLOBIN: CPT

## 2025-08-06 PROCEDURE — 85610 PROTHROMBIN TIME: CPT

## 2025-08-06 PROCEDURE — 36416 COLLJ CAPILLARY BLOOD SPEC: CPT

## 2025-08-06 PROCEDURE — 36415 COLL VENOUS BLD VENIPUNCTURE: CPT

## 2025-08-27 ENCOUNTER — LAB (OUTPATIENT)
Dept: LAB | Facility: CLINIC | Age: 75
End: 2025-08-27

## 2025-08-27 DIAGNOSIS — I73.9 PERIPHERAL VASCULAR DISEASE, UNSPECIFIED: ICD-10-CM

## 2025-08-27 DIAGNOSIS — Z79.01 LONG TERM (CURRENT) USE OF ANTICOAGULANTS: ICD-10-CM

## 2025-08-27 LAB — INR BLD: 1.9 (ref 0.9–1.1)

## 2025-08-27 PROCEDURE — 36416 COLLJ CAPILLARY BLOOD SPEC: CPT

## 2025-08-27 PROCEDURE — 85610 PROTHROMBIN TIME: CPT
